# Patient Record
Sex: MALE | Race: WHITE | NOT HISPANIC OR LATINO | Employment: OTHER | URBAN - METROPOLITAN AREA
[De-identification: names, ages, dates, MRNs, and addresses within clinical notes are randomized per-mention and may not be internally consistent; named-entity substitution may affect disease eponyms.]

---

## 2017-01-01 ENCOUNTER — OFFICE VISIT (OUTPATIENT)
Dept: ONCOLOGY | Facility: CLINIC | Age: 82
End: 2017-01-01

## 2017-01-01 ENCOUNTER — HOSPITAL ENCOUNTER (OUTPATIENT)
Dept: CT IMAGING | Facility: HOSPITAL | Age: 82
Discharge: HOME | End: 2017-12-08
Admitting: INTERNAL MEDICINE

## 2017-01-01 ENCOUNTER — OFFICE VISIT (OUTPATIENT)
Dept: FAMILY MEDICINE CLINIC | Facility: CLINIC | Age: 82
End: 2017-01-01

## 2017-01-01 ENCOUNTER — LAB (OUTPATIENT)
Dept: LAB | Facility: HOSPITAL | Age: 82
End: 2017-01-01
Attending: INTERNAL MEDICINE

## 2017-01-01 ENCOUNTER — HOSPITAL ENCOUNTER (OUTPATIENT)
Dept: GENERAL RADIOLOGY | Facility: HOSPITAL | Age: 82
Discharge: HOME | End: 2017-11-29
Admitting: INTERNAL MEDICINE

## 2017-01-01 ENCOUNTER — TELEPHONE (OUTPATIENT)
Dept: FAMILY MEDICINE CLINIC | Facility: CLINIC | Age: 82
End: 2017-01-01

## 2017-01-01 VITALS
DIASTOLIC BLOOD PRESSURE: 58 MMHG | HEIGHT: 69 IN | HEART RATE: 88 BPM | RESPIRATION RATE: 18 BRPM | SYSTOLIC BLOOD PRESSURE: 120 MMHG | WEIGHT: 191 LBS | BODY MASS INDEX: 28.29 KG/M2

## 2017-01-01 VITALS
WEIGHT: 190.6 LBS | SYSTOLIC BLOOD PRESSURE: 128 MMHG | HEIGHT: 69 IN | HEART RATE: 79 BPM | BODY MASS INDEX: 28.23 KG/M2 | RESPIRATION RATE: 16 BRPM | OXYGEN SATURATION: 94 % | DIASTOLIC BLOOD PRESSURE: 84 MMHG | TEMPERATURE: 97.9 F

## 2017-01-01 VITALS
RESPIRATION RATE: 16 BRPM | WEIGHT: 192 LBS | BODY MASS INDEX: 28.44 KG/M2 | HEART RATE: 83 BPM | SYSTOLIC BLOOD PRESSURE: 138 MMHG | DIASTOLIC BLOOD PRESSURE: 64 MMHG | HEIGHT: 69 IN

## 2017-01-01 VITALS
RESPIRATION RATE: 16 BRPM | SYSTOLIC BLOOD PRESSURE: 124 MMHG | DIASTOLIC BLOOD PRESSURE: 72 MMHG | BODY MASS INDEX: 28.58 KG/M2 | HEART RATE: 60 BPM | HEIGHT: 69 IN | TEMPERATURE: 98 F | WEIGHT: 193 LBS | OXYGEN SATURATION: 98 %

## 2017-01-01 DIAGNOSIS — R07.81 RIB PAIN ON RIGHT SIDE: ICD-10-CM

## 2017-01-01 DIAGNOSIS — R93.89 ABNORMAL CHEST X-RAY: Primary | ICD-10-CM

## 2017-01-01 DIAGNOSIS — R91.8 MASS OF LEFT LUNG: Primary | ICD-10-CM

## 2017-01-01 DIAGNOSIS — E78.00 HYPERCHOLESTEROLEMIA: Primary | ICD-10-CM

## 2017-01-01 DIAGNOSIS — R60.0 BILATERAL EDEMA OF LOWER EXTREMITY: ICD-10-CM

## 2017-01-01 DIAGNOSIS — I10 ESSENTIAL HYPERTENSION: ICD-10-CM

## 2017-01-01 DIAGNOSIS — R91.8 OPACITY OF LUNG ON IMAGING STUDY: ICD-10-CM

## 2017-01-01 DIAGNOSIS — E11.42 TYPE 2 DIABETES MELLITUS WITH POLYNEUROPATHY (HCC): ICD-10-CM

## 2017-01-01 DIAGNOSIS — R07.81 RIB PAIN ON RIGHT SIDE: Primary | ICD-10-CM

## 2017-01-01 DIAGNOSIS — E78.00 HYPERCHOLESTEROLEMIA: ICD-10-CM

## 2017-01-01 DIAGNOSIS — D3A.012 BENIGN CARCINOID TUMOR OF ILEUM: Primary | ICD-10-CM

## 2017-01-01 DIAGNOSIS — R91.8 MASS OF LEFT LUNG: ICD-10-CM

## 2017-01-01 DIAGNOSIS — R10.11 RIGHT UPPER QUADRANT PAIN: ICD-10-CM

## 2017-01-01 DIAGNOSIS — E11.42 TYPE 2 DIABETES MELLITUS WITH DIABETIC POLYNEUROPATHY, WITHOUT LONG-TERM CURRENT USE OF INSULIN (HCC): ICD-10-CM

## 2017-01-01 DIAGNOSIS — I10 ESSENTIAL HYPERTENSION: Primary | ICD-10-CM

## 2017-01-01 DIAGNOSIS — Z00.00 MEDICARE ANNUAL WELLNESS VISIT, SUBSEQUENT: Primary | ICD-10-CM

## 2017-01-01 LAB
ALBUMIN SERPL-MCNC: 3.5 G/DL (ref 3.5–5.2)
ALBUMIN/GLOB SERPL: 1.5 G/DL
ALP SERPL-CCNC: 99 U/L (ref 39–117)
ALT SERPL-CCNC: 13 U/L (ref 1–41)
AST SERPL-CCNC: 11 U/L (ref 1–40)
BASOPHILS # BLD AUTO: 0.02 10*3/MM3 (ref 0–0.2)
BASOPHILS # BLD AUTO: 0.03 10*3/MM3 (ref 0–0.1)
BASOPHILS NFR BLD AUTO: 0.2 % (ref 0–1.5)
BASOPHILS NFR BLD AUTO: 0.5 % (ref 0–1.1)
BILIRUB SERPL-MCNC: 0.4 MG/DL (ref 0.1–1.2)
BUN SERPL-MCNC: 15 MG/DL (ref 8–23)
BUN/CREAT SERPL: 20.3 (ref 7–25)
CALCIUM SERPL-MCNC: 8.5 MG/DL (ref 8.6–10.5)
CHLORIDE SERPL-SCNC: 102 MMOL/L (ref 98–107)
CHOLEST SERPL-MCNC: 130 MG/DL (ref 0–200)
CO2 SERPL-SCNC: 29.8 MMOL/L (ref 22–29)
CREAT BLDA-MCNC: 0.8 MG/DL (ref 0.6–1.3)
CREAT SERPL-MCNC: 0.74 MG/DL (ref 0.76–1.27)
DEPRECATED RDW RBC AUTO: 45.9 FL (ref 37–49)
EOSINOPHIL # BLD AUTO: 0.07 10*3/MM3 (ref 0–0.36)
EOSINOPHIL # BLD AUTO: 0.07 10*3/MM3 (ref 0–0.7)
EOSINOPHIL NFR BLD AUTO: 0.9 % (ref 0.3–6.2)
EOSINOPHIL NFR BLD AUTO: 1.2 % (ref 1–5)
ERYTHROCYTE [DISTWIDTH] IN BLOOD BY AUTOMATED COUNT: 13.1 % (ref 11.7–14.5)
ERYTHROCYTE [DISTWIDTH] IN BLOOD BY AUTOMATED COUNT: 14.1 % (ref 11.5–14.5)
GLOBULIN SER CALC-MCNC: 2.3 GM/DL
GLUCOSE SERPL-MCNC: 130 MG/DL (ref 65–99)
HBA1C MFR BLD: 6.8 % (ref 4.8–5.6)
HCT VFR BLD AUTO: 44.1 % (ref 40–49)
HCT VFR BLD AUTO: 45.3 % (ref 40.4–52.2)
HDLC SERPL-MCNC: 52 MG/DL (ref 40–60)
HGB BLD-MCNC: 14.5 G/DL (ref 13.7–17.6)
HGB BLD-MCNC: 15.1 G/DL (ref 13.5–16.5)
IMM GRANULOCYTES # BLD: 0.02 10*3/MM3 (ref 0–0.03)
IMM GRANULOCYTES # BLD: 0.03 10*3/MM3 (ref 0–0.03)
IMM GRANULOCYTES NFR BLD: 0.2 % (ref 0–0.5)
IMM GRANULOCYTES NFR BLD: 0.5 % (ref 0–0.5)
LDLC SERPL CALC-MCNC: 54 MG/DL (ref 0–100)
LDLC/HDLC SERPL: 1.03 {RATIO}
LYMPHOCYTES # BLD AUTO: 1.26 10*3/MM3 (ref 1–3.5)
LYMPHOCYTES # BLD AUTO: 1.38 10*3/MM3 (ref 0.9–4.8)
LYMPHOCYTES NFR BLD AUTO: 17.2 % (ref 19.6–45.3)
LYMPHOCYTES NFR BLD AUTO: 20.9 % (ref 20–49)
MCH RBC QN AUTO: 32.4 PG (ref 27–32.7)
MCH RBC QN AUTO: 32.8 PG (ref 27–33)
MCHC RBC AUTO-ENTMCNC: 32 G/DL (ref 32.6–36.4)
MCHC RBC AUTO-ENTMCNC: 34.2 G/DL (ref 32–35)
MCV RBC AUTO: 101.3 FL (ref 79.8–96.2)
MCV RBC AUTO: 95.9 FL (ref 83–97)
MONOCYTES # BLD AUTO: 0.37 10*3/MM3 (ref 0.25–0.8)
MONOCYTES # BLD AUTO: 0.54 10*3/MM3 (ref 0.2–1.2)
MONOCYTES NFR BLD AUTO: 6.1 % (ref 4–12)
MONOCYTES NFR BLD AUTO: 6.7 % (ref 5–12)
NEUTROPHILS # BLD AUTO: 4.28 10*3/MM3 (ref 1.5–7)
NEUTROPHILS # BLD AUTO: 6.01 10*3/MM3 (ref 1.9–8.1)
NEUTROPHILS NFR BLD AUTO: 70.8 % (ref 39–75)
NEUTROPHILS NFR BLD AUTO: 74.8 % (ref 42.7–76)
NRBC BLD MANUAL-RTO: 0 /100 WBC (ref 0–0)
PLATELET # BLD AUTO: 200 10*3/MM3 (ref 150–375)
PLATELET # BLD AUTO: 221 10*3/MM3 (ref 140–500)
PMV BLD AUTO: 10.5 FL (ref 8.9–12.1)
POTASSIUM SERPL-SCNC: 4.6 MMOL/L (ref 3.5–5.2)
PROT SERPL-MCNC: 5.8 G/DL (ref 6–8.5)
RBC # BLD AUTO: 4.47 10*6/MM3 (ref 4.6–6)
RBC # BLD AUTO: 4.6 10*6/MM3 (ref 4.3–5.5)
SODIUM SERPL-SCNC: 143 MMOL/L (ref 136–145)
TRIGL SERPL-MCNC: 122 MG/DL (ref 0–150)
VLDLC SERPL CALC-MCNC: 24.4 MG/DL (ref 5–40)
WBC # BLD AUTO: 8.04 10*3/MM3 (ref 4.5–10.7)
WBC NRBC COR # BLD: 6.04 10*3/MM3 (ref 4–10)

## 2017-01-01 PROCEDURE — 99214 OFFICE O/P EST MOD 30 MIN: CPT | Performed by: INTERNAL MEDICINE

## 2017-01-01 PROCEDURE — 85025 COMPLETE CBC W/AUTO DIFF WBC: CPT | Performed by: INTERNAL MEDICINE

## 2017-01-01 PROCEDURE — 71101 X-RAY EXAM UNILAT RIBS/CHEST: CPT

## 2017-01-01 PROCEDURE — 99213 OFFICE O/P EST LOW 20 MIN: CPT | Performed by: INTERNAL MEDICINE

## 2017-01-01 PROCEDURE — 36415 COLL VENOUS BLD VENIPUNCTURE: CPT | Performed by: INTERNAL MEDICINE

## 2017-01-01 PROCEDURE — 82565 ASSAY OF CREATININE: CPT

## 2017-01-01 PROCEDURE — 0 IOPAMIDOL 61 % SOLUTION: Performed by: INTERNAL MEDICINE

## 2017-01-01 PROCEDURE — G0438 PPPS, INITIAL VISIT: HCPCS | Performed by: NURSE PRACTITIONER

## 2017-01-01 PROCEDURE — 71260 CT THORAX DX C+: CPT

## 2017-01-01 RX ORDER — HYDROCHLOROTHIAZIDE 12.5 MG/1
12.5 TABLET ORAL DAILY
Qty: 90 TABLET | Refills: 1 | Status: SHIPPED | OUTPATIENT
Start: 2017-01-01 | End: 2018-01-01 | Stop reason: SDUPTHER

## 2017-01-01 RX ORDER — LEVOFLOXACIN 500 MG/1
500 TABLET, FILM COATED ORAL DAILY
Qty: 7 TABLET | Refills: 0 | Status: SHIPPED | OUTPATIENT
Start: 2017-01-01 | End: 2017-01-01

## 2017-01-01 RX ORDER — INFLUENZA A VIRUS A/MICHIGAN/45/2015 X-275 (H1N1) ANTIGEN (FORMALDEHYDE INACTIVATED), INFLUENZA A VIRUS A/SINGAPORE/INFIMH-16-0019/2016 IVR-186 (H3N2) ANTIGEN (FORMALDEHYDE INACTIVATED), AND INFLUENZA B VIRUS B/MARYLAND/15/2016 BX-69A (A B/COLORADO/6/2017-LIKE VIRUS) ANTIGEN (FORMALDEHYDE INACTIVATED) 60; 60; 60 UG/.5ML; UG/.5ML; UG/.5ML
INJECTION, SUSPENSION INTRAMUSCULAR
Refills: 0 | COMMUNITY
Start: 2017-09-20 | End: 2017-01-01

## 2017-01-01 RX ADMIN — IOPAMIDOL 75 ML: 612 INJECTION, SOLUTION INTRAVENOUS at 09:04

## 2017-02-03 DIAGNOSIS — E78.00 HYPERCHOLESTEROLEMIA: ICD-10-CM

## 2017-02-03 DIAGNOSIS — I10 ESSENTIAL HYPERTENSION: Primary | ICD-10-CM

## 2017-02-03 DIAGNOSIS — E11.42 TYPE 2 DIABETES MELLITUS WITH POLYNEUROPATHY (HCC): ICD-10-CM

## 2017-02-03 RX ORDER — LOSARTAN POTASSIUM 100 MG/1
TABLET ORAL
Qty: 90 TABLET | Refills: 3 | Status: SHIPPED | OUTPATIENT
Start: 2017-02-03 | End: 2018-01-01 | Stop reason: SDUPTHER

## 2017-02-06 LAB
ALBUMIN SERPL-MCNC: 3.9 G/DL (ref 3.5–5.2)
ALBUMIN/GLOB SERPL: 1.9 G/DL
ALP SERPL-CCNC: 103 U/L (ref 39–117)
ALT SERPL-CCNC: 21 U/L (ref 1–41)
AST SERPL-CCNC: 20 U/L (ref 1–40)
BASOPHILS # BLD AUTO: 0.03 10*3/MM3 (ref 0–0.2)
BASOPHILS NFR BLD AUTO: 0.5 % (ref 0–1.5)
BILIRUB SERPL-MCNC: 0.4 MG/DL (ref 0.1–1.2)
BUN SERPL-MCNC: 13 MG/DL (ref 8–23)
BUN/CREAT SERPL: 14.8 (ref 7–25)
CALCIUM SERPL-MCNC: 9 MG/DL (ref 8.6–10.5)
CHLORIDE SERPL-SCNC: 102 MMOL/L (ref 98–107)
CHOLEST SERPL-MCNC: 147 MG/DL (ref 0–200)
CO2 SERPL-SCNC: 29.4 MMOL/L (ref 22–29)
CREAT SERPL-MCNC: 0.88 MG/DL (ref 0.76–1.27)
EOSINOPHIL # BLD AUTO: 0.11 10*3/MM3 (ref 0–0.7)
EOSINOPHIL NFR BLD AUTO: 1.8 % (ref 0.3–6.2)
ERYTHROCYTE [DISTWIDTH] IN BLOOD BY AUTOMATED COUNT: 14.4 % (ref 11.5–14.5)
GLOBULIN SER CALC-MCNC: 2.1 GM/DL
GLUCOSE SERPL-MCNC: 157 MG/DL (ref 65–99)
HBA1C MFR BLD: 6.6 % (ref 4.8–5.6)
HCT VFR BLD AUTO: 46.7 % (ref 40.4–52.2)
HDLC SERPL-MCNC: 61 MG/DL (ref 40–60)
HGB BLD-MCNC: 14.8 G/DL (ref 13.7–17.6)
IMM GRANULOCYTES # BLD: 0 10*3/MM3 (ref 0–0.03)
IMM GRANULOCYTES NFR BLD: 0 % (ref 0–0.5)
LDLC SERPL CALC-MCNC: 61 MG/DL (ref 0–100)
LDLC/HDLC SERPL: 1 {RATIO}
LYMPHOCYTES # BLD AUTO: 1.46 10*3/MM3 (ref 0.9–4.8)
LYMPHOCYTES NFR BLD AUTO: 23.9 % (ref 19.6–45.3)
MCH RBC QN AUTO: 31.6 PG (ref 27–32.7)
MCHC RBC AUTO-ENTMCNC: 31.7 G/DL (ref 32.6–36.4)
MCV RBC AUTO: 99.8 FL (ref 79.8–96.2)
MONOCYTES # BLD AUTO: 0.4 10*3/MM3 (ref 0.2–1.2)
MONOCYTES NFR BLD AUTO: 6.5 % (ref 5–12)
NEUTROPHILS # BLD AUTO: 4.12 10*3/MM3 (ref 1.9–8.1)
NEUTROPHILS NFR BLD AUTO: 67.3 % (ref 42.7–76)
PLATELET # BLD AUTO: 227 10*3/MM3 (ref 140–500)
POTASSIUM SERPL-SCNC: 4.6 MMOL/L (ref 3.5–5.2)
PROT SERPL-MCNC: 6 G/DL (ref 6–8.5)
RBC # BLD AUTO: 4.68 10*6/MM3 (ref 4.6–6)
SODIUM SERPL-SCNC: 142 MMOL/L (ref 136–145)
TRIGL SERPL-MCNC: 125 MG/DL (ref 0–150)
VLDLC SERPL CALC-MCNC: 25 MG/DL (ref 5–40)
WBC # BLD AUTO: 6.12 10*3/MM3 (ref 4.5–10.7)

## 2017-02-13 ENCOUNTER — OFFICE VISIT (OUTPATIENT)
Dept: FAMILY MEDICINE CLINIC | Facility: CLINIC | Age: 82
End: 2017-02-13

## 2017-02-13 VITALS
TEMPERATURE: 98.2 F | HEART RATE: 80 BPM | DIASTOLIC BLOOD PRESSURE: 70 MMHG | WEIGHT: 187 LBS | BODY MASS INDEX: 27.7 KG/M2 | HEIGHT: 69 IN | SYSTOLIC BLOOD PRESSURE: 125 MMHG | OXYGEN SATURATION: 95 % | RESPIRATION RATE: 16 BRPM

## 2017-02-13 DIAGNOSIS — E11.42 TYPE 2 DIABETES MELLITUS WITH DIABETIC POLYNEUROPATHY, WITHOUT LONG-TERM CURRENT USE OF INSULIN (HCC): ICD-10-CM

## 2017-02-13 DIAGNOSIS — Z23 NEED FOR VACCINATION: Primary | ICD-10-CM

## 2017-02-13 DIAGNOSIS — E78.00 HYPERCHOLESTEROLEMIA: ICD-10-CM

## 2017-02-13 DIAGNOSIS — I10 ESSENTIAL HYPERTENSION: ICD-10-CM

## 2017-02-13 PROCEDURE — 90471 IMMUNIZATION ADMIN: CPT | Performed by: INTERNAL MEDICINE

## 2017-02-13 PROCEDURE — 90715 TDAP VACCINE 7 YRS/> IM: CPT | Performed by: INTERNAL MEDICINE

## 2017-02-13 PROCEDURE — 99213 OFFICE O/P EST LOW 20 MIN: CPT | Performed by: INTERNAL MEDICINE

## 2017-02-13 RX ORDER — PRAVASTATIN SODIUM 40 MG
40 TABLET ORAL DAILY
Qty: 90 TABLET | Refills: 3 | Status: SHIPPED | OUTPATIENT
Start: 2017-02-13 | End: 2018-01-01 | Stop reason: SDUPTHER

## 2017-02-13 RX ORDER — PRAVASTATIN SODIUM 40 MG
40 TABLET ORAL DAILY
Qty: 90 TABLET | Refills: 3 | Status: SHIPPED | OUTPATIENT
Start: 2017-02-13 | End: 2017-01-01 | Stop reason: SDUPTHER

## 2017-02-13 RX ORDER — AZITHROMYCIN 250 MG/1
TABLET, FILM COATED ORAL
Qty: 6 TABLET | Refills: 0 | Status: SHIPPED | OUTPATIENT
Start: 2017-02-13 | End: 2017-01-01

## 2017-02-13 NOTE — PROGRESS NOTES
Subjective   Carlito Hicks is a 81 y.o. male. 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   • Balance problem      DUE TO VISION TROUBLE; USES A CANE   • COPD (chronic obstructive pulmonary disease)    • DDD (degenerative disc disease), cervical      SLIGHT NEUROPATHY RIGHT HAND   • Diabetes mellitus      DIET CONTROLLED   • Elevated prostate specific antigen (PSA)    • GERD (gastroesophageal reflux disease)    • Glaucoma    • H/O: lung cancer    • Hiatal hernia    • Hx of renal calculi    • Hx: recurrent pneumonia    • Hyperlipemia    • Hypertension    • Non-small cell lung cancer      Stage IB   • Partial small bowel obstruction    • Prostate cancer    • Sleep apnea      USES CPAP   • Spinal stenosis    • Visual impairment      BLIND LEFT EYE; 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    • Sulfa Antibiotics Other (See Comments)     Allergic to eye drops   • Tetracycline Other (See Comments)     BURNS ON THE BACK OF BOTH HANDS      Social History     Social History   • Marital status:      Spouse name: Lianna   • Number of children: 5   • Years of education: Post Grad     Occupational History   • [employer]  Retired     Social History Main Topics   • Smoking status: Former Smoker     Packs/day: 1.50     Years: 11.00     Types: Cigarettes     Quit date: 9/2/1975   • Smokeless tobacco: Never Used   • Alcohol use 1.2 oz/week     2 Shots of liquor per week   • Drug use: No   • Sexual activity: Defer     Other Topics Concern   • Not on file     Social  History Narrative        Current Outpatient Prescriptions:   •  albuterol (PROAIR HFA) 108 (90 BASE) MCG/ACT inhaler, Inhale 2 puffs every 4 (four) hours as needed for wheezing., Disp: , Rfl:   •  aspirin 81 MG tablet, Take 81 mg by mouth daily. PT TO STOP PER MD INSTRUCTION, Disp: , Rfl:   •  atropine 1 % ophthalmic solution, Administer 1 drop into the left eye daily., Disp: , Rfl:   •  cetirizine (ZyrTEC) 10 MG tablet, Take 10 mg by mouth daily., Disp: , Rfl:   •  cholecalciferol (VITAMIN D3) 1000 UNITS tablet, Take 1,000 Units by mouth daily., Disp: , Rfl:   •  Diphenhydramine-APAP, sleep, (TYLENOL PM EXTRA STRENGTH PO), Take 500 mg by mouth Every Night., Disp: , Rfl:   •  Docusate Calcium (STOOL SOFTENER PO), Take 100 mg by mouth Every Night., Disp: , Rfl:   •  losartan (COZAAR) 100 MG tablet, TAKE 1 TABLET DAILY, Disp: 90 tablet, Rfl: 3  •  pravastatin (PRAVACHOL) 40 MG tablet, Take 1 tablet by mouth Daily., Disp: 90 tablet, Rfl: 3  •  prednisoLONE acetate (PRED FORTE) 1 % ophthalmic suspension, Administer 1 drop into the left eye daily., Disp: , Rfl:   •  Probiotic Product (PROBIOTIC PO), Take  by mouth 2 (Two) Times a Day., Disp: , Rfl:   •  Psyllium (METAMUCIL PO), Take 4 capsules by mouth daily., Disp: , Rfl:   •  ranitidine (ZANTAC) 75 MG tablet, Take 75 mg by mouth every night., Disp: , Rfl:   •  azithromycin (ZITHROMAX) 250 MG tablet, Take 2 tablets the first day, then 1 tablet daily for 4 days., Disp: 6 tablet, Rfl: 0  •  pravastatin (PRAVACHOL) 40 MG tablet, Take 1 tablet by mouth Daily., Disp: 90 tablet, Rfl: 3     Objective     HPI Comments: He is here to follow-up with type 2 diabetes, hypertension and hypercholesterolemia.    He claims to feel well.    Diabetes     Hypertension     Hyperlipidemia          Review of Systems   Constitutional: Negative.    HENT: Negative.    Respiratory: Negative.    Cardiovascular: Negative.    Musculoskeletal: Negative.    Psychiatric/Behavioral: Negative.         Physical Exam   Constitutional: He is oriented to person, place, and time. He appears well-developed and well-nourished.   HENT:   Head: Normocephalic and atraumatic.   Pulmonary/Chest: Effort normal.   Neurological: He is alert and oriented to person, place, and time.   Psychiatric: He has a normal mood and affect. His behavior is normal.   Nursing note and vitals reviewed.        Problem List Items Addressed This Visit        Cardiovascular and Mediastinum    Essential hypertension    Hypercholesterolemia    Relevant Medications    pravastatin (PRAVACHOL) 40 MG tablet       Endocrine    Type 2 diabetes mellitus      Other Visit Diagnoses     Need for vaccination    -  Primary    Relevant Orders    Td Vaccine Greater Than or Equal To 8yo With Preservative IM (Completed)            PLAN his excellent control of his type 2 diabetes.    His hypertension is well-controlled.    His hypercholesterolemia is well-controlled.    I like to see him back in about 3-4 months.  About a week before that visit I would like you following labs: Comprehensive metabolic panel, urine microalbumin, hemoglobin A1c.    He ought to get a lipid profile and conference metabolic panel done approximately every 6 months.    He ought follow-up for a yearly Medicare wellness visit.    No Follow-up on file.

## 2017-05-09 DIAGNOSIS — I10 ESSENTIAL HYPERTENSION: Primary | ICD-10-CM

## 2017-05-09 DIAGNOSIS — E11.42 TYPE 2 DIABETES MELLITUS WITH POLYNEUROPATHY (HCC): ICD-10-CM

## 2017-05-09 DIAGNOSIS — E78.00 HYPERCHOLESTEROLEMIA: ICD-10-CM

## 2017-05-11 LAB
ALBUMIN SERPL-MCNC: 3.4 G/DL (ref 3.5–5.2)
ALBUMIN/CREAT UR: <3.1 MG/G CREAT (ref 0–30)
ALBUMIN/GLOB SERPL: 1.2 G/DL
ALP SERPL-CCNC: 110 U/L (ref 39–117)
ALT SERPL-CCNC: 25 U/L (ref 1–41)
AST SERPL-CCNC: 18 U/L (ref 1–40)
BILIRUB SERPL-MCNC: 0.4 MG/DL (ref 0.1–1.2)
BUN SERPL-MCNC: 16 MG/DL (ref 8–23)
BUN/CREAT SERPL: 17.2 (ref 7–25)
CALCIUM SERPL-MCNC: 9 MG/DL (ref 8.6–10.5)
CHLORIDE SERPL-SCNC: 101 MMOL/L (ref 98–107)
CHOLEST SERPL-MCNC: 135 MG/DL (ref 0–200)
CO2 SERPL-SCNC: 29.1 MMOL/L (ref 22–29)
CREAT SERPL-MCNC: 0.93 MG/DL (ref 0.76–1.27)
CREAT UR-MCNC: 96.2 MG/DL
GLOBULIN SER CALC-MCNC: 2.8 GM/DL
GLUCOSE SERPL-MCNC: 127 MG/DL (ref 65–99)
HBA1C MFR BLD: 6.3 % (ref 4.8–5.6)
HDLC SERPL-MCNC: 59 MG/DL (ref 40–60)
LDLC SERPL CALC-MCNC: 60 MG/DL (ref 0–100)
LDLC/HDLC SERPL: 1.01 {RATIO}
MICROALBUMIN UR-MCNC: <3 UG/ML
POTASSIUM SERPL-SCNC: 4.7 MMOL/L (ref 3.5–5.2)
PROT SERPL-MCNC: 6.2 G/DL (ref 6–8.5)
SODIUM SERPL-SCNC: 141 MMOL/L (ref 136–145)
TRIGL SERPL-MCNC: 82 MG/DL (ref 0–150)
VLDLC SERPL CALC-MCNC: 16.4 MG/DL (ref 5–40)

## 2017-05-18 ENCOUNTER — OFFICE VISIT (OUTPATIENT)
Dept: FAMILY MEDICINE CLINIC | Facility: CLINIC | Age: 82
End: 2017-05-18

## 2017-05-18 VITALS
TEMPERATURE: 97.9 F | HEIGHT: 69 IN | SYSTOLIC BLOOD PRESSURE: 118 MMHG | DIASTOLIC BLOOD PRESSURE: 68 MMHG | OXYGEN SATURATION: 98 % | BODY MASS INDEX: 27.55 KG/M2 | RESPIRATION RATE: 16 BRPM | WEIGHT: 186 LBS | HEART RATE: 83 BPM

## 2017-05-18 DIAGNOSIS — E78.00 HYPERCHOLESTEROLEMIA: ICD-10-CM

## 2017-05-18 DIAGNOSIS — I10 ESSENTIAL HYPERTENSION: Primary | ICD-10-CM

## 2017-05-18 DIAGNOSIS — E11.42 TYPE 2 DIABETES MELLITUS WITH DIABETIC POLYNEUROPATHY, WITHOUT LONG-TERM CURRENT USE OF INSULIN (HCC): ICD-10-CM

## 2017-05-18 PROCEDURE — 99213 OFFICE O/P EST LOW 20 MIN: CPT | Performed by: INTERNAL MEDICINE

## 2017-09-05 DIAGNOSIS — E11.42 TYPE 2 DIABETES MELLITUS WITH POLYNEUROPATHY (HCC): ICD-10-CM

## 2017-09-05 DIAGNOSIS — I10 ESSENTIAL HYPERTENSION: Primary | ICD-10-CM

## 2017-09-05 DIAGNOSIS — E78.00 HYPERCHOLESTEROLEMIA: ICD-10-CM

## 2017-09-11 ENCOUNTER — TELEPHONE (OUTPATIENT)
Dept: ONCOLOGY | Facility: CLINIC | Age: 82
End: 2017-09-11

## 2017-09-11 NOTE — TELEPHONE ENCOUNTER
Pt calling because he has some questions about his 1 year f/u. Attempted to call back, no answer. Left message for him to return call.

## 2017-09-12 ENCOUNTER — TELEPHONE (OUTPATIENT)
Dept: GENERAL RADIOLOGY | Facility: HOSPITAL | Age: 82
End: 2017-09-12

## 2017-09-12 ENCOUNTER — TELEPHONE (OUTPATIENT)
Dept: ONCOLOGY | Facility: HOSPITAL | Age: 82
End: 2017-09-12

## 2017-09-12 NOTE — TELEPHONE ENCOUNTER
----- Message from Ania Theodore RN sent at 9/12/2017 12:42 PM EDT -----  Patient was sent a yearly letter to set up his labs and scans. He does NOT want to do the scan and only wants to do the lab draw (also refusing the urine test as well, so just blood work). Will CC  to let him know.

## 2017-09-12 NOTE — TELEPHONE ENCOUNTER
Patient called to verify what all he was needing to be scheduled for with his upcoming yearly appnts. Explained the octreotide scan, 24hr urine collection and blood work. Pt is refusing to have the scan or 24hr urine. States  gave him the all clear last year and he doesn't want to do it bc of the cost of the test. Msg sent to scheduling and to .

## 2017-09-13 LAB
ALBUMIN SERPL-MCNC: 3.8 G/DL (ref 3.5–5.2)
ALBUMIN/GLOB SERPL: 1.9 G/DL
ALP SERPL-CCNC: 111 U/L (ref 39–117)
ALT SERPL-CCNC: 18 U/L (ref 1–41)
AST SERPL-CCNC: 14 U/L (ref 1–40)
BILIRUB SERPL-MCNC: 0.4 MG/DL (ref 0.1–1.2)
BUN SERPL-MCNC: 13 MG/DL (ref 8–23)
BUN/CREAT SERPL: 16.5 (ref 7–25)
CALCIUM SERPL-MCNC: 9 MG/DL (ref 8.6–10.5)
CHLORIDE SERPL-SCNC: 100 MMOL/L (ref 98–107)
CHOLEST SERPL-MCNC: 139 MG/DL (ref 0–200)
CO2 SERPL-SCNC: 30.5 MMOL/L (ref 22–29)
CREAT SERPL-MCNC: 0.79 MG/DL (ref 0.76–1.27)
GLOBULIN SER CALC-MCNC: 2 GM/DL
GLUCOSE SERPL-MCNC: 146 MG/DL (ref 65–99)
HBA1C MFR BLD: 6.52 % (ref 4.8–5.6)
HDLC SERPL-MCNC: 63 MG/DL (ref 40–60)
LDLC SERPL CALC-MCNC: 57 MG/DL (ref 0–100)
LDLC/HDLC SERPL: 0.91 {RATIO}
POTASSIUM SERPL-SCNC: 4.9 MMOL/L (ref 3.5–5.2)
PROT SERPL-MCNC: 5.8 G/DL (ref 6–8.5)
SODIUM SERPL-SCNC: 141 MMOL/L (ref 136–145)
TRIGL SERPL-MCNC: 94 MG/DL (ref 0–150)
VLDLC SERPL CALC-MCNC: 18.8 MG/DL (ref 5–40)

## 2017-09-20 ENCOUNTER — OFFICE VISIT (OUTPATIENT)
Dept: FAMILY MEDICINE CLINIC | Facility: CLINIC | Age: 82
End: 2017-09-20

## 2017-09-20 VITALS
SYSTOLIC BLOOD PRESSURE: 124 MMHG | OXYGEN SATURATION: 98 % | HEIGHT: 69 IN | WEIGHT: 189 LBS | TEMPERATURE: 98 F | BODY MASS INDEX: 27.99 KG/M2 | RESPIRATION RATE: 16 BRPM | DIASTOLIC BLOOD PRESSURE: 72 MMHG | HEART RATE: 80 BPM

## 2017-09-20 DIAGNOSIS — E78.00 HYPERCHOLESTEROLEMIA: ICD-10-CM

## 2017-09-20 DIAGNOSIS — E11.42 TYPE 2 DIABETES MELLITUS WITH DIABETIC POLYNEUROPATHY, WITHOUT LONG-TERM CURRENT USE OF INSULIN (HCC): ICD-10-CM

## 2017-09-20 DIAGNOSIS — I10 ESSENTIAL HYPERTENSION: Primary | ICD-10-CM

## 2017-09-20 PROCEDURE — 99213 OFFICE O/P EST LOW 20 MIN: CPT | Performed by: INTERNAL MEDICINE

## 2017-09-20 NOTE — PROGRESS NOTES
Subjective   Carlito Hicks is a 82 y.o. male. 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   • Balance problem     DUE TO VISION TROUBLE; USES A CANE   • COPD (chronic obstructive pulmonary disease)    • DDD (degenerative disc disease), cervical     SLIGHT NEUROPATHY RIGHT HAND   • Diabetes mellitus     DIET CONTROLLED   • Elevated prostate specific antigen (PSA)    • GERD (gastroesophageal reflux disease)    • Glaucoma    • H/O: lung cancer    • Hiatal hernia    • Hx of renal calculi    • Hx: recurrent pneumonia    • Hyperlipemia    • Hypertension    • Non-small cell lung cancer     Stage IB   • Partial small bowel obstruction    • Prostate cancer    • Sleep apnea     USES CPAP   • Spinal stenosis    • Visual impairment     BLIND LEFT EYE; 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    • Sulfa Antibiotics Other (See Comments)     Allergic to eye drops   • Tetracycline Other (See Comments)     BURNS ON THE BACK OF BOTH HANDS      Social History     Social History   • Marital status:      Spouse name: Lianna   • Number of children: 5   • Years of education: Post Grad     Occupational History   • [employer]  Retired     Social History Main Topics   • Smoking status: Former Smoker     Packs/day: 1.50     Years: 11.00     Types: Cigarettes     Quit date: 9/2/1975   • Smokeless tobacco: Never Used   • Alcohol use 1.2 oz/week     2 Shots of liquor per week   • Drug use: No   • Sexual activity: Defer     Other Topics Concern   • Not on file     Social History  Narrative        Current Outpatient Prescriptions:   •  albuterol (PROAIR HFA) 108 (90 BASE) MCG/ACT inhaler, Inhale 2 puffs every 4 (four) hours as needed for wheezing., Disp: , Rfl:   •  aspirin 81 MG tablet, Take 81 mg by mouth daily. PT TO STOP PER MD INSTRUCTION, Disp: , Rfl:   •  atropine 1 % ophthalmic solution, Administer 1 drop into the left eye daily., Disp: , Rfl:   •  azithromycin (ZITHROMAX) 250 MG tablet, Take 2 tablets the first day, then 1 tablet daily for 4 days., Disp: 6 tablet, Rfl: 0  •  cetirizine (ZyrTEC) 10 MG tablet, Take 10 mg by mouth daily., Disp: , Rfl:   •  cholecalciferol (VITAMIN D3) 1000 UNITS tablet, Take 1,000 Units by mouth daily., Disp: , Rfl:   •  Diphenhydramine-APAP, sleep, (TYLENOL PM EXTRA STRENGTH PO), Take 500 mg by mouth Every Night., Disp: , Rfl:   •  Docusate Calcium (STOOL SOFTENER PO), Take 100 mg by mouth Every Night., Disp: , Rfl:   •  losartan (COZAAR) 100 MG tablet, TAKE 1 TABLET DAILY, Disp: 90 tablet, Rfl: 3  •  pravastatin (PRAVACHOL) 40 MG tablet, Take 1 tablet by mouth Daily., Disp: 90 tablet, Rfl: 3  •  pravastatin (PRAVACHOL) 40 MG tablet, Take 1 tablet by mouth Daily., Disp: 90 tablet, Rfl: 3  •  prednisoLONE acetate (PRED FORTE) 1 % ophthalmic suspension, Administer 1 drop into the left eye daily., Disp: , Rfl:   •  Probiotic Product (PROBIOTIC PO), Take  by mouth 2 (Two) Times a Day., Disp: , Rfl:   •  Psyllium (METAMUCIL PO), Take 4 capsules by mouth daily., Disp: , Rfl:   •  ranitidine (ZANTAC) 75 MG tablet, Take 75 mg by mouth every night., Disp: , Rfl:      Objective     HPI Comments: He is here today to follow-up on some lab work done last week for routine monitoring of his type 2 diabetes, hypercholesterolemia, and hypertension.    He tells me that he feels well.    Hypertension     Hyperlipidemia     Diabetes          Review of Systems   Constitutional: Negative.    HENT: Negative.    Respiratory: Negative.    Cardiovascular: Negative.     Musculoskeletal: Negative.    Psychiatric/Behavioral: Negative.        Physical Exam   Constitutional: He is oriented to person, place, and time. He appears well-developed and well-nourished.   HENT:   Head: Normocephalic and atraumatic.   Pulmonary/Chest: Effort normal.   Neurological: He is alert and oriented to person, place, and time.   Psychiatric: He has a normal mood and affect. His behavior is normal.   Nursing note and vitals reviewed.        Problem List Items Addressed This Visit        Cardiovascular and Mediastinum    Essential hypertension - Primary    Hypercholesterolemia       Endocrine    Type 2 diabetes mellitus            PLAN  His type 2 diabetes is well-controlled.    His hypercholesterolemia is well-controlled.    His hypertension is well-controlled.    I asked him to follow-up in 3 months.  No Follow-up on file.

## 2017-10-18 NOTE — PROGRESS NOTES
REASON FOR FOLLOWUP:   1. 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   2.  Negative octreotide scan on 10/05/2016.  Negative 24-hour urine 5 HIAA and the negative serum chromogranin A level.         HISTORY OF PRESENT ILLNESS: The patient is an 82 y.o. year old male here for annual reevaluation.  Patient is accompanied by his wife.  Patient declined octreotide scan this year.   He reports doing very well, except is getting older.  His performance status ECOG 1.  Denies pains no bloating in the abdomen.  He has been eating well, no weight loss, no nausea vomiting no diarrhea constipation.      Laboratory study today showed a normal CBC including hemoglobin 15.1, WBC 6000, and platelets 200,000.  He had a normal CMP except elevated glucose at 146 last month on 9/13/2017.           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   • Balance problem         DUE TO VISION TROUBLE; USES A CANE   • COPD (chronic obstructive pulmonary disease)     • DDD (degenerative disc disease), cervical         SLIGHT NEUROPATHY RIGHT HAND   • Diabetes mellitus         DIET CONTROLLED   • Elevated prostate specific antigen (PSA)     • GERD (gastroesophageal reflux disease)     • Glaucoma and cataract      • H/O: lung cancer     • Hiatal hernia     • Hx of renal calculi     • Hx: recurrent pneumonia     • Hyperlipemia     • Hypertension     • Partial small bowel obstruction     • Prostate cancer     • Sleep apnea         USES CPAP   • Spinal stenosis     • Visual impairment         BLIND LEFT EYE; 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    • Hernia repair       • Lung surgery       • Other surgical history            NASAL SEPTAL DEVIATION REPAIR   • Prostate surgery       • Wrist surgery       • Colonoscopy   10/25/2011   • Esophagoscopy / egd   11/13/2013   • Tonsillectomy       • Nasal septum surgery       • Cystoscopy bladder stone lithotripsy       • Thoracotomy       • Colon resection Right 9/8/2016       Procedure: SMALL BOWEL RESECTION LAPAROSCOPIC ; Surgeon: Josef Veloz MD; Location: [facility]; Service:          HEMATOLOGIC/ONCOLOGIC HISTORY: (History from previous dates can be found in the separate document.)  Also see above current history.      1.  Prostate cancer 2004 status post a radical prostatectomy, PSA was negative in January 2016, patient follows by Dr. Saucedo.      2. Non-small cell lung cancer. Initially had a right lobe lobectomy in 2006 for stage IB disease. Subsequently had second primary lung cancer status post right pneumonectomy in August 2008. No adjuvant chemotherapy. Had positive margin but unable to receive radiation therapy due to a persistent fistula at that time. Patient follows up with Dr. Woods regularly.  No evidence of disease recurrence with most recent CT scan 08/18/2016.     3. Surgical resection of small bowel carcinoid by Dr. Roselia MD at the [facility] in September 2016.      According to patient, patient has been followed by his urologist Dr. Saucedo regularly because of abnormal lesion in his kidney. He roughly gets CT scan about every 6 months. Most recent his CT scan on 08/18/2016.  I reviewed her CT scan report, which showed 2 small low attenuation lesions in the left kidney and 2 lesions in the right kidney, or 10 mm or less. However there is size of small bowel obstruction with dilatation of the distal ileum up to 5 cm in diameter, proximal to a segment of the bowel measuring 20 mm in diameter in 26 mm in length. There is also 15 mm partially calcified mesenteric node. There is no evidence of lymphadenopathy. There is no evidence of disease in the  chest, with stable right pneumonectomy changes.      Patient subsequently was seen by Dr. Veloz for evaluation. Dr. Veloz did a exploratory laparoscopic surgery with resection of partial small bowel was and mesenteric mass on 09/08/2016.     Pathology evaluation of the small bowel lesion reported well-differentiated neuroendocrine tumor, carcinoid tumor, extending through full thickness of the muscularis propria and into the adipose tissue. The tumor measures 2.3 cm ×2.0×2.0, unifocal, pT3 disease. Margins were free of tumor. There was 3 benign mesenteric lymph nodes. There is a mesenteric mass, measuring 1.9 cm in the largest examination, showed well differentiated neuroendocrine tumor, carcinoid tumor diffusely infiltrating soft tissue. The second mesenteric mass showed infarcted the adipose tissue.      He reports prior to the surgery, he only had one time of diarrhea, and may be a few times with mild hot flashes eyes phase but it nothing was extra ordinarily.  He was purposefully losing weight, had a good appetite, no abdomen pain no nausea vomiting.      Laboratory study on on 09/11/2016 reported a serum chromogranin level at 3 nmol/L, 24-hour urine sample 5 HIAA and 2.6 mg/L, with total 24-hour urine 5 HIAA at 4.7 MG.      Patient had a negative octreotide scan on 10/05/2016.      MEDICATIONS: Reviewed and documented on EMR.  Including losartan, pravastatin, albuterol inhaler as needed, low-dose aspirin, Metamucil, stool softener, Tylenol PM, vitamin D3, ranitidine, Zyrtec, atropine sulfate ophtha solution, prednisone acetate ophtha solution     ALLERGIES:          Allergies   Allergen Reactions   • Brimonidine         EYE INFLAMATION.    • Sulfa Antibiotics Other (See Comments)       Allergic to eye drops   • Tetracycline Other (See Comments)       BURNS ON THE BACK OF BOTH HANDS         SOCIAL HISTORY:    Social History    Social History            Social History   • Marital status:        Spouse  name: N/A   • Number of children: 5   • Years of education:  graduated school          Occupational History   •  retired  [employer]              Social History Main Topics   • Smoking status: Former Smoker, 22-pack-year        Quit date: 9/2/1975   • Smokeless tobacco: Never Used   • Alcohol use 1.2 oz/week        2 Shots of liquor per week    • Drug use: No   • Sexual activity: Defer                   FAMILY HISTORY:        Family History   Problem Relation Age of Onset   • Cancer  mother diagnosis of breast cancer age 72, however she passed away at age of 84 due to heart attack.       • Heart disease  mother       Father passed away at age of 90 due to stroke.  He was hypertensive and also had gallbladder stone.  Patient had one sister diagnosed of breast cancer age of 73, and diagnosed of ovarian cancer at ages 75, and colon cancer at age 79 and then passed away at age of 81 due to malignancy.       REVIEW OF SYSTEMS:  GENERAL: No change in appetite or weight;   No fevers, chills, sweats.   SKIN: No nonhealing lesions.  No rashes.  HEME/LYMPH: No easy bruising, bleeding.   No swollen nodes.   EYES: Patient is blind with left eye.  Right eye has poor vision.  ENT: No tinnitus, hearing loss, gum bleeding, epistaxis, hoarseness or dysphagia.  Patient has poor hearing  RESPIRATORY: Mild cough, and shortness of breath because of pneumonectomy, no hemoptysis or wheezing.   CVS: No chest pain, palpitations, orthopnea, dyspnea on exertion or PND.   GI: See history of present illness.   : No lower tract obstructive symptoms, dysuria or hematuria.  Has increase in nocturia.   MUSCULOSKELETAL: Has chronic back pain.    NEUROLOGICAL: No global weakness, loss of consciousness or seizures.   PSYCHIATRIC: No increased nervousness, mood changes or depression.      Objective       Vitals:    10/18/17 1130   BP: 128/84   Pulse: 79   Resp: 16   Temp: 97.9 °F (36.6 °C)   TempSrc: Oral   SpO2: 94%   Weight: 190 lb 9.6 oz  "(86.5 kg)   Height: 68.5\" (174 cm)  Comment: New Ht Yearly   PainSc: 0-No pain   ECOG 1         PHYSICAL EXAM:   GENERAL: Well-developed, well-nourished elder male in no acute distress.    SKIN: Warm, dry without rashes, purpura or petechiae.  HEAD: Normocephalic.  EYES: Left eye blindness due to infection.  Right eye has poor vision. Conjunctivae normal.  EARS: Poor hearing  NOSE: No nasal discharge.  MOUTH: Tongue is well-papillated; no stomatitis or ulcers. Lips normal.  THROAT: Oropharynx without lesions or exudates.  NECK: Supple with good range of motion; no thyromegaly or masses.  LYMPHATICS: No cervical, supraclavicular, axillary adenopathy.  CHEST: Lungs clear to auscultation on the left sided. The right lung has diminished breathing sounds.   CARDIAC: Regular rate and rhythm without murmurs, rubs or gallops. Normal S1,S2.  ABDOMEN: Soft, nontender with no organomegaly or masses.  Bowel sounds normal.  EXTREMITIES: No clubbing, cyanosis or edema.  NEUROLOGICAL: Cranial Nerves II-XII grossly intact, except his vision.   PSYCHIATRIC: Normal affect and mood.      RECENT LABS:     Lab Results   Component Value Date    WBC 6.04 10/18/2017    HGB 15.1 10/18/2017    HCT 44.1 10/18/2017    MCV 95.9 10/18/2017     10/18/2017     Lab Results   Component Value Date    NEUTROABS 4.28 10/18/2017     Lab Results   Component Value Date    GLUCOSE 173 (H) 09/10/2016    BUN 13 09/13/2017    CREATININE 0.79 09/13/2017    EGFRIFNONA 94 09/13/2017    EGFRIFAFRI 114 09/13/2017    BCR 16.5 09/13/2017    CO2 30.5 (H) 09/13/2017    CALCIUM 9.0 09/13/2017    PROTENTOTREF 5.8 (L) 09/13/2017    ALBUMIN 3.80 09/13/2017    LABIL2 1.9 09/13/2017    AST 14 09/13/2017    ALT 18 09/13/2017     Sodium   Date Value Ref Range Status   09/13/2017 141 136 - 145 mmol/L Final   09/10/2016 140 136 - 145 mmol/L Final     Potassium   Date Value Ref Range Status   09/13/2017 4.9 3.5 - 5.2 mmol/L Final   09/10/2016 3.7 3.5 - 5.2 mmol/L Final "     Total Bilirubin   Date Value Ref Range Status   09/13/2017 0.4 0.1 - 1.2 mg/dL Final     Alkaline Phosphatase   Date Value Ref Range Status   09/13/2017 111 39 - 117 U/L Final   ]         Assessment/Plan      1. Carcinoid involving the distal small bowel and the mesentery, status post surgical resection on 09/08/2016.  Laboratory study showed normal serum chromogranin A level, together with 24-hour urine 5-HIAA level on 09/12/2016.  He had octreotide scan on 10/05/2016, which has been reported completing negative.     This year patient declined octreotide scan as well as 24-hour urine study and a blood study.  He reports doing well, no symptoms related to carcinoid.  Patient thinks he is very healthy except is getting older with some chronic arthritis pain.     2.  History of lung cancer, twice eventually had a right pneumonectomy in 2008.  Physical examination today is negative for lymphadenopathy.  He has no cough or hemoptysis or weight loss, etc.   No evidence of disease recurrence.      3.  We'll bring patient back for annual MD visit, we'll check CBC at that time.      ISABEL TAMEZ M.D., Ph.D.    10/18/2017        Cc:  MD Dr. Alexis Alfaro Dr., M.D.    Dr. Carlito Saucedo M.D.          Dragon disclaimer:  Much of this encounter note is an electronic transcription/translation of spoken language to printed text. The electronic translation of spoken language may permit erroneous, or at times, nonsensical words or phrases to be inadvertently transcribed; Although I have reviewed the note for such errors, some may still exist.

## 2017-11-29 PROBLEM — R07.81 RIB PAIN ON RIGHT SIDE: Status: ACTIVE | Noted: 2017-01-01

## 2017-11-29 NOTE — PATIENT INSTRUCTIONS
I believe that your pain is musculoskeletal.  We will check a PA chest x-ray and right rib details.

## 2017-11-29 NOTE — PROGRESS NOTES
Subjective   Carlito Hicks is a 82 y.o. male. Patient is here today for   Chief Complaint   Patient presents with   • Right Sided Rib Pain     x few months- pulled a heavy table           Vitals:    11/29/17 0916   BP: 138/64   Pulse: 83   Resp: 16     The following portions of the patient's history were reviewed and updated as appropriate: allergies, current medications, past family history, past medical history, past social history, past surgical history and problem list.    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   • Balance problem     DUE TO VISION TROUBLE; USES A CANE   • COPD (chronic obstructive pulmonary disease)    • DDD (degenerative disc disease), cervical     SLIGHT NEUROPATHY RIGHT HAND   • Diabetes mellitus     DIET CONTROLLED   • Elevated prostate specific antigen (PSA)    • GERD (gastroesophageal reflux disease)    • Glaucoma    • H/O: lung cancer    • Hiatal hernia    • Hx of renal calculi    • Hx: recurrent pneumonia    • Hyperlipemia    • Hypertension    • Non-small cell lung cancer     Stage IB   • Partial small bowel obstruction    • Prostate cancer    • Sleep apnea     USES CPAP   • Spinal stenosis    • Visual impairment     BLIND LEFT EYE; 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    • Sulfa Antibiotics Other (See Comments)     Allergic to eye drops   • Tetracycline Other (See Comments)     BURNS ON THE BACK OF BOTH HANDS      Social History     Social History   • Marital status:      Spouse name: Lianna   • Number of children: 5   • Years of education: Post Grad     Occupational History   • [employer]  Retired     Social History Main Topics   • Smoking status: Former Smoker     Packs/day: 1.50     Years: 22.00     Types: Cigarettes     Quit date: 9/2/1975   •  Smokeless tobacco: Never Used   • Alcohol use 1.2 oz/week     2 Shots of liquor per week   • Drug use: No   • Sexual activity: Defer     Other Topics Concern   • Not on file     Social History Narrative        Current Outpatient Prescriptions:   •  albuterol (PROAIR HFA) 108 (90 BASE) MCG/ACT inhaler, Inhale 2 puffs every 4 (four) hours as needed for wheezing., Disp: , Rfl:   •  aspirin 81 MG tablet, Take 81 mg by mouth daily. PT TO STOP PER MD INSTRUCTION, Disp: , Rfl:   •  atropine 1 % ophthalmic solution, Administer 1 drop into the left eye daily., Disp: , Rfl:   •  cetirizine (ZyrTEC) 10 MG tablet, Take 10 mg by mouth daily., Disp: , Rfl:   •  cholecalciferol (VITAMIN D3) 1000 UNITS tablet, Take 1,000 Units by mouth daily., Disp: , Rfl:   •  Diphenhydramine-APAP, sleep, (TYLENOL PM EXTRA STRENGTH PO), Take 500 mg by mouth Every Night., Disp: , Rfl:   •  Docusate Calcium (STOOL SOFTENER PO), Take 100 mg by mouth Every Night., Disp: , Rfl:   •  losartan (COZAAR) 100 MG tablet, TAKE 1 TABLET DAILY, Disp: 90 tablet, Rfl: 3  •  pravastatin (PRAVACHOL) 40 MG tablet, Take 1 tablet by mouth Daily., Disp: 90 tablet, Rfl: 3  •  prednisoLONE acetate (PRED FORTE) 1 % ophthalmic suspension, Administer 1 drop into the left eye daily., Disp: , Rfl:   •  Probiotic Product (PROBIOTIC PO), Take  by mouth 2 (Two) Times a Day., Disp: , Rfl:   •  Psyllium (METAMUCIL PO), Take 4 capsules by mouth daily., Disp: , Rfl:   •  ranitidine (ZANTAC) 75 MG tablet, Take 75 mg by mouth every night., Disp: , Rfl:      Objective     History of Present Illness Carlito bent over about 6 weeks ago and felt a painful pop in his right anterior lower rib cage.  He continues to complain of pain in certain positions like bending over.  He does not have pain when he lies on the right side.  He denies any pulmonary or GI complaints.  He does have a distant history of lung cancer and has had a right pneumonectomy.  He also has history of small bowel  carcinoid tumor.      Review of Systems   Constitutional: Negative.    Respiratory: Negative for shortness of breath.    Cardiovascular: Negative for chest pain.   Gastrointestinal: Negative for abdominal pain.       Physical Exam   Constitutional: He appears well-developed and well-nourished.   Early gentleman who is alert pleasant and cooperative   Cardiovascular: Normal rate, regular rhythm and normal heart sounds.    Pulmonary/Chest: Effort normal. No respiratory distress. He has no wheezes. He has no rales. He exhibits no tenderness.   Abdominal: Soft. Bowel sounds are normal. He exhibits no distension and no mass. There is no tenderness.   Musculoskeletal:   There is no palpable tenderness of the right rib cage or costochondral joints   Neurological: He is alert.   Psychiatric: He has a normal mood and affect.   Vitals reviewed.      ASSESSMENT     Problem List Items Addressed This Visit        Nervous and Auditory    Rib pain on right side - Primary    Relevant Orders    XR Ribs Right With PA Chest (Completed)          PLAN  Patient Instructions   I believe that your pain is musculoskeletal.  We will check a PA chest x-ray and right rib details.    Return if symptoms worsen or fail to improve.

## 2017-12-04 NOTE — TELEPHONE ENCOUNTER
CALLED PATIENT BACK AND NOTIFIED HIM THAT XR JUST SHOWED OLD RIB FRACTURES. ALSO LEFT PERIHILAR NODULAR OPACITY AND NEEDS CT W + W/O CONTRAST DONE.   PATIENT UNDERSTOOD.    ----- Message from Charlotte Motta MA sent at 12/4/2017  2:37 PM EST -----  This patient saw Dr. Leong for this   ----- Message -----     From: Antonina Spear     Sent: 12/4/2017   2:25 PM       To: Charlotte Motta MA    WANTING RESULTS OF XRAYS FROM LAST WEEK     [phone]

## 2017-12-12 PROBLEM — R91.8 OPACITY OF LUNG ON IMAGING STUDY: Status: ACTIVE | Noted: 2017-01-01

## 2017-12-12 NOTE — PATIENT INSTRUCTIONS
Reviewed and discussed results of x-rays and recent CT scan.  We'll additionally check a complete blood count today.  Start Levaquin 100 mg daily for 7 days.  We'll plan on repeating a chest x-ray in one month.

## 2017-12-12 NOTE — PROGRESS NOTES
Subjective   Carlito Hicks is a 82 y.o. male. Patient is here today for   Chief Complaint   Patient presents with   • Follow-up     CT RESULTS          Vitals:    12/12/17 1310   BP: 120/58   Pulse: 88   Resp: 18     The following portions of the patient's history were reviewed and updated as appropriate: allergies, current medications, past family history, past medical history, past social history, past surgical history and problem list.    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   • Balance problem     DUE TO VISION TROUBLE; USES A CANE   • COPD (chronic obstructive pulmonary disease)    • DDD (degenerative disc disease), cervical     SLIGHT NEUROPATHY RIGHT HAND   • Diabetes mellitus     DIET CONTROLLED   • Elevated prostate specific antigen (PSA)    • GERD (gastroesophageal reflux disease)    • Glaucoma    • H/O: lung cancer    • Hiatal hernia    • Hx of renal calculi    • Hx: recurrent pneumonia    • Hyperlipemia    • Hypertension    • Non-small cell lung cancer     Stage IB   • Partial small bowel obstruction    • Prostate cancer    • Sleep apnea     USES CPAP   • Spinal stenosis    • Visual impairment     BLIND LEFT EYE; 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    • Sulfa Antibiotics Other (See Comments)     Allergic to eye drops   • Tetracycline Other (See Comments)     BURNS ON THE BACK OF BOTH HANDS      Social History     Social History   • Marital status:      Spouse name: Lianna   • Number of children: 5   • Years of education: Post Grad     Occupational History   • [employer]  Retired     Social History Main Topics   • Smoking status: Former Smoker     Packs/day: 1.50     Years: 22.00     Types: Cigarettes     Quit date: 9/2/1975   • Smokeless tobacco: Former User   •  Alcohol use 1.2 oz/week     2 Shots of liquor per week   • Drug use: No   • Sexual activity: Defer     Other Topics Concern   • Not on file     Social History Narrative        Current Outpatient Prescriptions:   •  albuterol (PROAIR HFA) 108 (90 BASE) MCG/ACT inhaler, Inhale 2 puffs every 4 (four) hours as needed for wheezing., Disp: , Rfl:   •  aspirin 81 MG tablet, Take 81 mg by mouth daily. PT TO STOP PER MD INSTRUCTION, Disp: , Rfl:   •  atropine 1 % ophthalmic solution, Administer 1 drop into the left eye daily., Disp: , Rfl:   •  cetirizine (ZyrTEC) 10 MG tablet, Take 10 mg by mouth daily., Disp: , Rfl:   •  cholecalciferol (VITAMIN D3) 1000 UNITS tablet, Take 1,000 Units by mouth daily., Disp: , Rfl:   •  Diphenhydramine-APAP, sleep, (TYLENOL PM EXTRA STRENGTH PO), Take 500 mg by mouth Every Night., Disp: , Rfl:   •  Docusate Calcium (STOOL SOFTENER PO), Take 100 mg by mouth Every Night., Disp: , Rfl:   •  losartan (COZAAR) 100 MG tablet, TAKE 1 TABLET DAILY, Disp: 90 tablet, Rfl: 3  •  pravastatin (PRAVACHOL) 40 MG tablet, Take 1 tablet by mouth Daily., Disp: 90 tablet, Rfl: 3  •  prednisoLONE acetate (PRED FORTE) 1 % ophthalmic suspension, Administer 1 drop into the left eye daily., Disp: , Rfl:   •  Probiotic Product (PROBIOTIC PO), Take  by mouth 2 (Two) Times a Day., Disp: , Rfl:   •  Psyllium (METAMUCIL PO), Take 4 capsules by mouth daily., Disp: , Rfl:   •  ranitidine (ZANTAC) 75 MG tablet, Take 75 mg by mouth every night., Disp: , Rfl:   •  levoFLOXacin (LEVAQUIN) 500 MG tablet, Take 1 tablet by mouth Daily., Disp: 7 tablet, Rfl: 0     Objective     History of Present Illness Carlito is here for follow-up on right sided rib pain.  His wife is present as well.  He was seen last week and x-rays were ordered which were negative for fracture.  A chest x-ray was also done given his history of a previous right pneumonectomy in the distant past and a recent history of surgery for removal of a carcinoid tumor.   Chest x-ray revealed a vague opacity in the perihilar region of the left hilum.  A CT scan was obtained and the radiologist felt the opacity could be inflammatory in nature.  He does have a cough and does have shortness of breath which for him has been chronic since his pneumonectomy.  Denies any fever or fatigue.    Review of Systems   Constitutional: Negative for fatigue and fever.   Respiratory: Positive for cough and shortness of breath.    Cardiovascular: Positive for chest pain.       Physical Exam   Constitutional: He appears well-developed and well-nourished.   Elderly gentleman who is alert pleasant and cooperative.   Cardiovascular: Normal rate, regular rhythm and normal heart sounds.    Pulmonary/Chest: No respiratory distress. He has no wheezes. He has no rales.   Absent right breath sounds   Musculoskeletal:   There is mild tenderness of the right anterior lower rib cage   Psychiatric: He has a normal mood and affect.   Vitals reviewed.      ASSESSMENT     Problem List Items Addressed This Visit        Nervous and Auditory    Rib pain on right side - Primary       Other    Opacity of lung on imaging study    Relevant Orders    CBC & Differential          PLAN  Patient Instructions   Reviewed and discussed results of x-rays and recent CT scan.  We'll additionally check a complete blood count today.  Start Levaquin 100 mg daily for 7 days.  We'll plan on repeating a chest x-ray in one month.    No Follow-up on file.

## 2017-12-12 NOTE — PROGRESS NOTES
QUICK REFERENCE INFORMATION:  The ABCs of the Annual Wellness Visit    Subsequent Medicare Wellness Visit    HEALTH RISK ASSESSMENT    1935    Recent Hospitalizations:  No hospitalization(s) within the last year..        Current Medical Providers:  Patient Care Team:  Alexis Denny MD as PCP - General (Internal Medicine)  Alexis Denny MD as PCP - Family Medicine  Alexis Denny MD as PCP - Claims Attributed  Luis Lowery MD PhD as Consulting Physician (Hematology and Oncology)  Josef Veloz MD as Referring Physician (General Surgery)        Smoking Status:  History   Smoking Status   • Former Smoker   • Packs/day: 1.50   • Years: 22.00   • Types: Cigarettes   • Quit date: 9/2/1975   Smokeless Tobacco   • Former User       Alcohol Consumption:  History   Alcohol Use   • 1.2 oz/week   • 2 Shots of liquor per week       Depression Screen:   PHQ-2/PHQ-9 Depression Screening 12/12/2017   Little interest or pleasure in doing things 1   Feeling down, depressed, or hopeless 1   Trouble falling or staying asleep, or sleeping too much 1   Feeling tired or having little energy 3   Poor appetite or overeating 2   Feeling bad about yourself - or that you are a failure or have let yourself or your family down 1   Trouble concentrating on things, such as reading the newspaper or watching television 1   Moving or speaking so slowly that other people could have noticed. Or the opposite - being so fidgety or restless that you have been moving around a lot more than usual 1   Thoughts that you would be better off dead, or of hurting yourself in some way 1   Total Score 12   If you checked off any problems, how difficult have these problems made it for you to do your work, take care of things at home, or get along with other people? Somewhat difficult       Health Habits and Functional and Cognitive Screening:  Functional & Cognitive Status 12/12/2017   Do you have difficulty preparing food and eating? No   Do you  have difficulty bathing yourself, getting dressed or grooming yourself? No   Do you have difficulty using the toilet? No   Do you have difficulty moving around from place to place? No   Do you have trouble with steps or getting out of a bed or a chair? No   In the past year have you fallen or experienced a near fall? No   Current Diet Diabetic Diet   Dental Exam Up to date   Eye Exam Up to date   Exercise (times per week) 3 times per week   Current Exercise Activities Include Walking   Do you need help using the phone?  No   Are you deaf or do you have serious difficulty hearing?  Yes   Do you need help with transportation? Yes   Do you need help shopping? No   Do you need help preparing meals?  No   Do you need help with housework?  No   Do you need help with laundry? No   Do you need help taking your medications? No   Do you need help managing money? No   Have you felt unusual stress, anger or loneliness in the last month? No   Who do you live with? Spouse   If you need help, do you have trouble finding someone available to you? No   Have you been bothered in the last four weeks by sexual problems? No   Do you have difficulty concentrating, remembering or making decisions? No           Does the patient have evidence of cognitive impairment? YES, IMPAIRED SHORT TERM MEMORY           Recent Lab Results:  CMP:  Lab Results   Component Value Date     (H) 09/13/2017    BUN 13 09/13/2017    CREATININE 0.80 12/08/2017    EGFRIFNONA 94 09/13/2017    EGFRIFAFRI 114 09/13/2017    BCR 16.5 09/13/2017     09/13/2017    K 4.9 09/13/2017    CO2 30.5 (H) 09/13/2017    CALCIUM 9.0 09/13/2017    PROTENTOTREF 5.8 (L) 09/13/2017    ALBUMIN 3.80 09/13/2017    LABGLOBREF 2.0 09/13/2017    LABIL2 1.9 09/13/2017    BILITOT 0.4 09/13/2017    ALKPHOS 111 09/13/2017    AST 14 09/13/2017    ALT 18 09/13/2017     Lipid Panel:  Lab Results   Component Value Date    TRIG 94 09/13/2017    HDL 63 (H) 09/13/2017    VLDL 18.8  09/13/2017    LDLHDL 0.91 09/13/2017     HbA1c:  Lab Results   Component Value Date    HGBA1C 6.52 (H) 09/13/2017         Age-appropriate Screening Schedule:  Refer to the list below for future screening recommendations based on patient's age, sex and/or medical conditions. Orders for these recommended tests are listed in the plan section. The patient has been provided with a written plan.    Health Maintenance   Topic Date Due   • DIABETIC FOOT EXAM  02/03/2016   • HEMOGLOBIN A1C  03/13/2018   • URINE MICROALBUMIN  05/10/2018   • DIABETIC EYE EXAM  07/13/2018   • LIPID PANEL  09/13/2018   • COLONOSCOPY  10/25/2021   • TDAP/TD VACCINES (3 - Td) 02/13/2027   • INFLUENZA VACCINE  Completed   • PNEUMOCOCCAL VACCINES (65+ LOW/MEDIUM RISK)  Completed   • ZOSTER VACCINE  Completed        Subjective   History of Present Illness    Carlito Hicks is a 82 y.o. male who presents for an Subsequent Wellness Visit.    The following portions of the patient's history were reviewed and updated as appropriate: allergies, current medications, past family history, past medical history, past social history, past surgical history and problem list.    Outpatient Medications Prior to Visit   Medication Sig Dispense Refill   • albuterol (PROAIR HFA) 108 (90 BASE) MCG/ACT inhaler Inhale 2 puffs every 4 (four) hours as needed for wheezing.     • aspirin 81 MG tablet Take 81 mg by mouth daily. PT TO STOP PER MD INSTRUCTION     • atropine 1 % ophthalmic solution Administer 1 drop into the left eye daily.     • cetirizine (ZyrTEC) 10 MG tablet Take 10 mg by mouth daily.     • cholecalciferol (VITAMIN D3) 1000 UNITS tablet Take 1,000 Units by mouth daily.     • Diphenhydramine-APAP, sleep, (TYLENOL PM EXTRA STRENGTH PO) Take 500 mg by mouth Every Night.     • Docusate Calcium (STOOL SOFTENER PO) Take 100 mg by mouth Every Night.     • losartan (COZAAR) 100 MG tablet TAKE 1 TABLET DAILY 90 tablet 3   • pravastatin (PRAVACHOL) 40 MG tablet Take 1  tablet by mouth Daily. 90 tablet 3   • prednisoLONE acetate (PRED FORTE) 1 % ophthalmic suspension Administer 1 drop into the left eye daily.     • Probiotic Product (PROBIOTIC PO) Take  by mouth 2 (Two) Times a Day.     • Psyllium (METAMUCIL PO) Take 4 capsules by mouth daily.     • ranitidine (ZANTAC) 75 MG tablet Take 75 mg by mouth every night.       No facility-administered medications prior to visit.        Patient Active Problem List   Diagnosis   • Type 2 diabetes mellitus   • Essential hypertension   • Hypercholesterolemia   • Hyperglycemia   • Pain of lumbar spine   • Diabetic polyneuropathy associated with type 2 diabetes mellitus   • Bowel obstruction   • Benign carcinoid tumor of the ileum   • Rib pain on right side       Advance Care Planning:  has an advance directive - a copy has been provided and is in file    Identification of Risk Factors:  Risk factors include: cardiovascular risk.    Review of Systems    Compared to one year ago, the patient feels his physical health is the same.  Compared to one year ago, the patient feels his mental health is the same.    Objective     Physical Exam    Vitals:    12/12/17 1129   PainSc: 4  Comment: ct scan done on friday   PainLoc: Rib Cage       There is no height or weight on file to calculate BMI.  Discussed the patient's BMI with him. BMI is above normal parameters. Follow-up plan includes:  no follow-up required.    Assessment/Plan   Patient Self-Management and Personalized Health Advice  The patient has been provided with information about: diet, exercise, weight management and fall prevention and preventive services including:   · Fall Risk assessment done.    Visit Diagnoses:  No diagnosis found.    No orders of the defined types were placed in this encounter.      Outpatient Encounter Prescriptions as of 12/12/2017   Medication Sig Dispense Refill   • albuterol (PROAIR HFA) 108 (90 BASE) MCG/ACT inhaler Inhale 2 puffs every 4 (four) hours as needed  for wheezing.     • aspirin 81 MG tablet Take 81 mg by mouth daily. PT TO STOP PER MD INSTRUCTION     • atropine 1 % ophthalmic solution Administer 1 drop into the left eye daily.     • cetirizine (ZyrTEC) 10 MG tablet Take 10 mg by mouth daily.     • cholecalciferol (VITAMIN D3) 1000 UNITS tablet Take 1,000 Units by mouth daily.     • Diphenhydramine-APAP, sleep, (TYLENOL PM EXTRA STRENGTH PO) Take 500 mg by mouth Every Night.     • Docusate Calcium (STOOL SOFTENER PO) Take 100 mg by mouth Every Night.     • losartan (COZAAR) 100 MG tablet TAKE 1 TABLET DAILY 90 tablet 3   • pravastatin (PRAVACHOL) 40 MG tablet Take 1 tablet by mouth Daily. 90 tablet 3   • prednisoLONE acetate (PRED FORTE) 1 % ophthalmic suspension Administer 1 drop into the left eye daily.     • Probiotic Product (PROBIOTIC PO) Take  by mouth 2 (Two) Times a Day.     • Psyllium (METAMUCIL PO) Take 4 capsules by mouth daily.     • ranitidine (ZANTAC) 75 MG tablet Take 75 mg by mouth every night.       No facility-administered encounter medications on file as of 12/12/2017.        Reviewed use of high risk medication in the elderly: yes  Reviewed for potential of harmful drug interactions in the elderly: no    Follow Up:  Will schedule a follow up to discuss CT results and continued rib pain  Follow up with Dr Denny as schedule with labs at the end of dec   An After Visit Summary and PPPS with all of these plans were given to the patient.

## 2017-12-28 PROBLEM — R60.0 BILATERAL EDEMA OF LOWER EXTREMITY: Status: ACTIVE | Noted: 2017-01-01

## 2017-12-28 PROBLEM — R10.11 RIGHT UPPER QUADRANT PAIN: Status: ACTIVE | Noted: 2017-01-01

## 2017-12-28 NOTE — TELEPHONE ENCOUNTER
Spoke to Renu and informed her that Dr. Denny did want them done at the same time, also put the correct order in the system   ----- Message from Therese Marie MA sent at 12/28/2017  3:57 PM EST -----  Contact: RENU WITH [facility] SCHEDULING  RENU WITH SCHEDULING WANTS TO KNOW IF CT OF ABDOMEN AND CT OF CHEST ARE TO BE DONE AT THE SAME TIME SHE IS ALSO STATING IT WOULD NOT LET HER SCHEDULE THE CT SCAN OF CHEST BECAUSE THE ORDERS WERE PUT IN INCORRECTLY / IT READS CHEST WITH AND WITHOUT CONTRAST/ RENU'S DIRECT PHONE NUMBER IS BELOW             [phone]

## 2017-12-28 NOTE — PROGRESS NOTES
Subjective   Carlito Hicks is a 82 y.o. male. 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   • Balance problem     DUE TO VISION TROUBLE; USES A CANE   • COPD (chronic obstructive pulmonary disease)    • DDD (degenerative disc disease), cervical     SLIGHT NEUROPATHY RIGHT HAND   • Diabetes mellitus     DIET CONTROLLED   • Elevated prostate specific antigen (PSA)    • GERD (gastroesophageal reflux disease)    • Glaucoma    • H/O: lung cancer    • Hiatal hernia    • Hx of renal calculi    • Hx: recurrent pneumonia    • Hyperlipemia    • Hypertension    • Non-small cell lung cancer     Stage IB   • Partial small bowel obstruction    • Prostate cancer    • Sleep apnea     USES CPAP   • Spinal stenosis    • Visual impairment     BLIND LEFT EYE; 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    • Sulfa Antibiotics Other (See Comments)     Allergic to eye drops   • Tetracycline Other (See Comments)     BURNS ON THE BACK OF BOTH HANDS      Social History     Social History   • Marital status:      Spouse name: Lianna   • Number of children: 5   • Years of education: Post Grad     Occupational History   • [employer]  Retired     Social History Main Topics   • Smoking status: Former Smoker     Packs/day: 1.50     Years: 22.00     Types: Cigarettes     Quit date: 9/2/1975   • Smokeless tobacco: Former User   • Alcohol use 1.2 oz/week     2 Shots of liquor per week   • Drug use: No   • Sexual activity: Defer     Other Topics Concern   • Not on file     Social History  Narrative        Current Outpatient Prescriptions:   •  albuterol (PROAIR HFA) 108 (90 BASE) MCG/ACT inhaler, Inhale 2 puffs every 4 (four) hours as needed for wheezing., Disp: , Rfl:   •  aspirin 81 MG tablet, Take 81 mg by mouth daily. PT TO STOP PER MD INSTRUCTION, Disp: , Rfl:   •  atropine 1 % ophthalmic solution, Administer 1 drop into the left eye daily., Disp: , Rfl:   •  cetirizine (ZyrTEC) 10 MG tablet, Take 10 mg by mouth daily., Disp: , Rfl:   •  cholecalciferol (VITAMIN D3) 1000 UNITS tablet, Take 1,000 Units by mouth daily., Disp: , Rfl:   •  Diphenhydramine-APAP, sleep, (TYLENOL PM EXTRA STRENGTH PO), Take 500 mg by mouth Every Night., Disp: , Rfl:   •  Docusate Calcium (STOOL SOFTENER PO), Take 100 mg by mouth Every Night., Disp: , Rfl:   •  losartan (COZAAR) 100 MG tablet, TAKE 1 TABLET DAILY, Disp: 90 tablet, Rfl: 3  •  pravastatin (PRAVACHOL) 40 MG tablet, Take 1 tablet by mouth Daily., Disp: 90 tablet, Rfl: 3  •  prednisoLONE acetate (PRED FORTE) 1 % ophthalmic suspension, Administer 1 drop into the left eye daily., Disp: , Rfl:   •  Probiotic Product (PROBIOTIC PO), Take  by mouth 2 (Two) Times a Day., Disp: , Rfl:   •  Psyllium (METAMUCIL PO), Take 4 capsules by mouth daily., Disp: , Rfl:   •  ranitidine (ZANTAC) 75 MG tablet, Take 75 mg by mouth every night., Disp: , Rfl:   •  hydrochlorothiazide (HYDRODIURIL) 12.5 MG tablet, Take 1 tablet by mouth Daily., Disp: 90 tablet, Rfl: 1     Objective     HPI Comments: He is here to follow-up on some lab work done last week follow-up on hypertension, hypercholesterolemia and type 2 diabetes.    A few months ago he noticed of the acute onset of right upper quadrant abdominal pain.  This issue has been persistent.  He denies any GI complaints.  The pain that he gets his fleeting severe.  It has been several times a day.        Hypertension     Hyperlipidemia     Diabetes          Review of Systems   Constitutional: Negative.    HENT: Negative.     Respiratory: Negative.    Cardiovascular: Negative.    Musculoskeletal:        He complains of fleeting, frequent, right upper quadrant abdominal pain.  No associated nausea vomiting diaphoresis.  No radiation of this pain.  No worsening with food.   Neurological: Negative.    Psychiatric/Behavioral: Negative.        Physical Exam   Constitutional: He is oriented to person, place, and time. He appears well-developed and well-nourished.   HENT:   Head: Normocephalic and atraumatic.   Cardiovascular: Normal rate, regular rhythm and normal heart sounds.    Pulmonary/Chest: Effort normal and breath sounds normal.   Abdominal:   His abdomen is soft, nontender to percussion or palpation, no guarding.    No pain on palpation of his right costal margin.  No apparent abdominal wall defect/hernia.   Musculoskeletal:   He has trace edema bilateral lower extremities.   Neurological: He is alert and oriented to person, place, and time.   Psychiatric: He has a normal mood and affect. His behavior is normal.   Nursing note and vitals reviewed.        Problem List Items Addressed This Visit        Cardiovascular and Mediastinum    Essential hypertension    Relevant Medications    hydrochlorothiazide (HYDRODIURIL) 12.5 MG tablet    Hypercholesterolemia - Primary       Endocrine    Type 2 diabetes mellitus       Nervous and Auditory    Rib pain on right side    Right upper quadrant pain       Other    Bilateral edema of lower extremity            PLAN  I gave him a prescription for hydrochlorothiazide 12.5 mg 1 by mouth every morning when necessary bilateral lower extremity edema.  He has a trace of edema in his feet and I think this is probably most likely dependent edema.    His hypertension is well-controlled.    His hypercholesterolemia is well-controlled.    His type 2 diabetes shows excellent control.    He has right rib pain and right upper quadrant pain.  He is due for a CT scan of his chest with and without contrast after  the second week in January to follow-up on a pulmonary consolidation in the left lower lobe.  At that time, I would like him to get a CT of his abdomen with and without contrast to begin to sort out his abdominal pain and right rib pain.  I feel the most likely diagnosis in this case however is musculoskeletal pain.  No Follow-up on file.

## 2018-01-01 ENCOUNTER — DOCUMENTATION (OUTPATIENT)
Dept: ONCOLOGY | Facility: CLINIC | Age: 83
End: 2018-01-01

## 2018-01-01 ENCOUNTER — TELEPHONE (OUTPATIENT)
Dept: RADIATION ONCOLOGY | Facility: HOSPITAL | Age: 83
End: 2018-01-01

## 2018-01-01 ENCOUNTER — RADIATION ONCOLOGY WEEKLY ASSESSMENT (OUTPATIENT)
Dept: RADIATION ONCOLOGY | Facility: HOSPITAL | Age: 83
End: 2018-01-01

## 2018-01-01 ENCOUNTER — DOCUMENTATION (OUTPATIENT)
Dept: RADIATION ONCOLOGY | Facility: HOSPITAL | Age: 83
End: 2018-01-01

## 2018-01-01 ENCOUNTER — TELEPHONE (OUTPATIENT)
Dept: FAMILY MEDICINE CLINIC | Facility: CLINIC | Age: 83
End: 2018-01-01

## 2018-01-01 ENCOUNTER — APPOINTMENT (OUTPATIENT)
Dept: RADIATION ONCOLOGY | Facility: HOSPITAL | Age: 83
End: 2018-01-01

## 2018-01-01 ENCOUNTER — OFFICE VISIT (OUTPATIENT)
Dept: FAMILY MEDICINE CLINIC | Facility: CLINIC | Age: 83
End: 2018-01-01

## 2018-01-01 ENCOUNTER — RADIATION ONCOLOGY WEEKLY ASSESSMENT (OUTPATIENT)
Dept: RADIATION ONCOLOGY | Facility: CLINIC | Age: 83
End: 2018-01-01

## 2018-01-01 ENCOUNTER — HOSPITAL ENCOUNTER (OUTPATIENT)
Dept: CT IMAGING | Facility: HOSPITAL | Age: 83
Discharge: HOME | End: 2018-01-17
Admitting: INTERNAL MEDICINE

## 2018-01-01 ENCOUNTER — OFFICE VISIT (OUTPATIENT)
Dept: ONCOLOGY | Facility: CLINIC | Age: 83
End: 2018-01-01

## 2018-01-01 ENCOUNTER — CONSULT (OUTPATIENT)
Dept: RADIATION ONCOLOGY | Facility: HOSPITAL | Age: 83
End: 2018-01-01

## 2018-01-01 ENCOUNTER — APPOINTMENT (OUTPATIENT)
Dept: OTHER | Facility: HOSPITAL | Age: 83
End: 2018-01-01

## 2018-01-01 ENCOUNTER — OFFICE VISIT (OUTPATIENT)
Dept: RADIATION ONCOLOGY | Facility: HOSPITAL | Age: 83
End: 2018-01-01

## 2018-01-01 ENCOUNTER — LAB (OUTPATIENT)
Dept: LAB | Facility: HOSPITAL | Age: 83
End: 2018-01-01

## 2018-01-01 ENCOUNTER — TELEPHONE (OUTPATIENT)
Dept: RADIATION ONCOLOGY | Facility: CLINIC | Age: 83
End: 2018-01-01

## 2018-01-01 VITALS
HEART RATE: 92 BPM | OXYGEN SATURATION: 96 % | WEIGHT: 188 LBS | TEMPERATURE: 98.4 F | DIASTOLIC BLOOD PRESSURE: 73 MMHG | BODY MASS INDEX: 28.17 KG/M2 | SYSTOLIC BLOOD PRESSURE: 112 MMHG

## 2018-01-01 VITALS
OXYGEN SATURATION: 95 % | SYSTOLIC BLOOD PRESSURE: 145 MMHG | TEMPERATURE: 97 F | DIASTOLIC BLOOD PRESSURE: 88 MMHG | BODY MASS INDEX: 28.17 KG/M2 | RESPIRATION RATE: 16 BRPM | WEIGHT: 188 LBS | HEART RATE: 80 BPM

## 2018-01-01 VITALS
OXYGEN SATURATION: 96 % | HEART RATE: 91 BPM | TEMPERATURE: 98.1 F | HEIGHT: 69 IN | DIASTOLIC BLOOD PRESSURE: 60 MMHG | BODY MASS INDEX: 26.78 KG/M2 | WEIGHT: 180.8 LBS | RESPIRATION RATE: 16 BRPM | SYSTOLIC BLOOD PRESSURE: 120 MMHG

## 2018-01-01 VITALS
DIASTOLIC BLOOD PRESSURE: 68 MMHG | TEMPERATURE: 98.6 F | HEART RATE: 98 BPM | BODY MASS INDEX: 27.11 KG/M2 | RESPIRATION RATE: 16 BRPM | WEIGHT: 183 LBS | SYSTOLIC BLOOD PRESSURE: 115 MMHG | HEIGHT: 69 IN | OXYGEN SATURATION: 98 %

## 2018-01-01 VITALS
RESPIRATION RATE: 16 BRPM | BODY MASS INDEX: 27.46 KG/M2 | HEART RATE: 69 BPM | OXYGEN SATURATION: 94 % | SYSTOLIC BLOOD PRESSURE: 138 MMHG | DIASTOLIC BLOOD PRESSURE: 81 MMHG | HEIGHT: 69 IN | WEIGHT: 185.4 LBS | TEMPERATURE: 97.6 F

## 2018-01-01 VITALS
DIASTOLIC BLOOD PRESSURE: 78 MMHG | HEART RATE: 90 BPM | RESPIRATION RATE: 16 BRPM | SYSTOLIC BLOOD PRESSURE: 131 MMHG | OXYGEN SATURATION: 93 % | TEMPERATURE: 97.4 F

## 2018-01-01 VITALS
WEIGHT: 185 LBS | TEMPERATURE: 97.2 F | OXYGEN SATURATION: 96 % | HEIGHT: 69 IN | BODY MASS INDEX: 27.4 KG/M2 | DIASTOLIC BLOOD PRESSURE: 80 MMHG | HEART RATE: 92 BPM | SYSTOLIC BLOOD PRESSURE: 128 MMHG

## 2018-01-01 VITALS
DIASTOLIC BLOOD PRESSURE: 78 MMHG | TEMPERATURE: 97.4 F | BODY MASS INDEX: 27.46 KG/M2 | OXYGEN SATURATION: 94 % | SYSTOLIC BLOOD PRESSURE: 120 MMHG | WEIGHT: 185.41 LBS | HEIGHT: 69 IN | HEART RATE: 73 BPM

## 2018-01-01 VITALS
BODY MASS INDEX: 27.05 KG/M2 | HEIGHT: 69 IN | HEART RATE: 78 BPM | WEIGHT: 182.6 LBS | TEMPERATURE: 98.4 F | DIASTOLIC BLOOD PRESSURE: 70 MMHG | SYSTOLIC BLOOD PRESSURE: 110 MMHG | RESPIRATION RATE: 16 BRPM | OXYGEN SATURATION: 95 %

## 2018-01-01 VITALS
RESPIRATION RATE: 16 BRPM | HEART RATE: 86 BPM | SYSTOLIC BLOOD PRESSURE: 119 MMHG | OXYGEN SATURATION: 94 % | DIASTOLIC BLOOD PRESSURE: 72 MMHG

## 2018-01-01 VITALS
SYSTOLIC BLOOD PRESSURE: 129 MMHG | BODY MASS INDEX: 27.11 KG/M2 | HEIGHT: 69 IN | RESPIRATION RATE: 16 BRPM | DIASTOLIC BLOOD PRESSURE: 78 MMHG | OXYGEN SATURATION: 93 % | TEMPERATURE: 96.8 F | WEIGHT: 183 LBS | HEART RATE: 89 BPM

## 2018-01-01 DIAGNOSIS — R07.81 RIB PAIN ON RIGHT SIDE: Primary | ICD-10-CM

## 2018-01-01 DIAGNOSIS — D3A.012 BENIGN CARCINOID TUMOR OF ILEUM: Primary | ICD-10-CM

## 2018-01-01 DIAGNOSIS — E11.42 TYPE 2 DIABETES MELLITUS WITH DIABETIC POLYNEUROPATHY, WITHOUT LONG-TERM CURRENT USE OF INSULIN (HCC): ICD-10-CM

## 2018-01-01 DIAGNOSIS — I10 ESSENTIAL HYPERTENSION: ICD-10-CM

## 2018-01-01 DIAGNOSIS — C34.32 MALIGNANT NEOPLASM OF LOWER LOBE OF LEFT LUNG (HCC): Primary | ICD-10-CM

## 2018-01-01 DIAGNOSIS — C34.91 ADENOCARCINOMA OF RIGHT LUNG (HCC): ICD-10-CM

## 2018-01-01 DIAGNOSIS — C34.91 ADENOCARCINOMA OF RIGHT LUNG (HCC): Primary | ICD-10-CM

## 2018-01-01 DIAGNOSIS — E78.00 HYPERCHOLESTEROLEMIA: Primary | ICD-10-CM

## 2018-01-01 DIAGNOSIS — G89.3 CANCER ASSOCIATED PAIN: ICD-10-CM

## 2018-01-01 DIAGNOSIS — M54.50 PAIN OF LUMBAR SPINE: ICD-10-CM

## 2018-01-01 DIAGNOSIS — M54.50 PAIN OF LUMBAR SPINE: Primary | ICD-10-CM

## 2018-01-01 DIAGNOSIS — E78.00 HYPERCHOLESTEROLEMIA: ICD-10-CM

## 2018-01-01 DIAGNOSIS — R91.8 OPACITY OF LUNG ON IMAGING STUDY: ICD-10-CM

## 2018-01-01 LAB
ALBUMIN SERPL-MCNC: 3.8 G/DL (ref 3.5–5.2)
ALBUMIN/GLOB SERPL: 1.6 G/DL
ALP SERPL-CCNC: 118 U/L (ref 39–117)
ALT SERPL-CCNC: 13 U/L (ref 1–41)
AST SERPL-CCNC: 11 U/L (ref 1–40)
BASOPHILS # BLD AUTO: 0.02 10*3/MM3 (ref 0–0.2)
BASOPHILS # BLD AUTO: 0.03 10*3/MM3 (ref 0–0.1)
BASOPHILS # BLD AUTO: 0.04 10*3/MM3 (ref 0–0.2)
BASOPHILS NFR BLD AUTO: 0.3 % (ref 0–1.5)
BASOPHILS NFR BLD AUTO: 0.5 % (ref 0–1.1)
BASOPHILS NFR BLD AUTO: 0.5 % (ref 0–1.5)
BILIRUB SERPL-MCNC: 0.3 MG/DL (ref 0.1–1.2)
BUN SERPL-MCNC: 19 MG/DL (ref 8–23)
BUN/CREAT SERPL: 25.7 (ref 7–25)
CALCIUM SERPL-MCNC: 9.4 MG/DL (ref 8.6–10.5)
CHLORIDE SERPL-SCNC: 100 MMOL/L (ref 98–107)
CHOLEST SERPL-MCNC: 140 MG/DL (ref 0–200)
CO2 SERPL-SCNC: 29.4 MMOL/L (ref 22–29)
CREAT BLDA-MCNC: 0.8 MG/DL (ref 0.6–1.3)
CREAT SERPL-MCNC: 0.74 MG/DL (ref 0.76–1.27)
DEPRECATED RDW RBC AUTO: 45.6 FL (ref 37–54)
DEPRECATED RDW RBC AUTO: 46.8 FL (ref 37–49)
EOSINOPHIL # BLD AUTO: 0.03 10*3/MM3 (ref 0–0.36)
EOSINOPHIL # BLD AUTO: 0.13 10*3/MM3 (ref 0–0.7)
EOSINOPHIL # BLD AUTO: 0.15 10*3/MM3 (ref 0–0.7)
EOSINOPHIL NFR BLD AUTO: 0.5 % (ref 1–5)
EOSINOPHIL NFR BLD AUTO: 1.5 % (ref 0.3–6.2)
EOSINOPHIL NFR BLD AUTO: 2.5 % (ref 0.3–6.2)
ERYTHROCYTE [DISTWIDTH] IN BLOOD BY AUTOMATED COUNT: 13.2 % (ref 11.5–14.5)
ERYTHROCYTE [DISTWIDTH] IN BLOOD BY AUTOMATED COUNT: 13.2 % (ref 11.7–14.5)
ERYTHROCYTE [DISTWIDTH] IN BLOOD BY AUTOMATED COUNT: 14.4 % (ref 11.5–14.5)
GLOBULIN SER CALC-MCNC: 2.4 GM/DL
GLUCOSE SERPL-MCNC: 149 MG/DL (ref 65–99)
HBA1C MFR BLD: 7.13 % (ref 4.8–5.6)
HCT VFR BLD AUTO: 41.6 % (ref 40.4–52.2)
HCT VFR BLD AUTO: 41.7 % (ref 40–49)
HCT VFR BLD AUTO: 44.1 % (ref 40.4–52.2)
HDLC SERPL-MCNC: 61 MG/DL (ref 40–60)
HGB BLD-MCNC: 13.8 G/DL (ref 13.5–16.5)
HGB BLD-MCNC: 14.3 G/DL (ref 13.7–17.6)
HGB BLD-MCNC: 14.4 G/DL (ref 13.7–17.6)
IMM GRANULOCYTES # BLD: 0.02 10*3/MM3 (ref 0–0.03)
IMM GRANULOCYTES # BLD: 0.04 10*3/MM3 (ref 0–0.03)
IMM GRANULOCYTES # BLD: 0.05 10*3/MM3 (ref 0–0.03)
IMM GRANULOCYTES NFR BLD: 0.3 % (ref 0–0.5)
IMM GRANULOCYTES NFR BLD: 0.6 % (ref 0–0.5)
IMM GRANULOCYTES NFR BLD: 0.6 % (ref 0–0.5)
LDLC SERPL CALC-MCNC: 62 MG/DL (ref 0–100)
LDLC/HDLC SERPL: 1.01 {RATIO}
LYMPHOCYTES # BLD AUTO: 0.69 10*3/MM3 (ref 1–3.5)
LYMPHOCYTES # BLD AUTO: 0.72 10*3/MM3 (ref 0.9–4.8)
LYMPHOCYTES # BLD AUTO: 1.87 10*3/MM3 (ref 0.9–4.8)
LYMPHOCYTES NFR BLD AUTO: 10.4 % (ref 20–49)
LYMPHOCYTES NFR BLD AUTO: 12.2 % (ref 19.6–45.3)
LYMPHOCYTES NFR BLD AUTO: 21.4 % (ref 19.6–45.3)
MCH RBC QN AUTO: 31.8 PG (ref 27–33)
MCH RBC QN AUTO: 32.7 PG (ref 27–32.7)
MCH RBC QN AUTO: 32.9 PG (ref 27–32.7)
MCHC RBC AUTO-ENTMCNC: 32.4 G/DL (ref 32.6–36.4)
MCHC RBC AUTO-ENTMCNC: 33.1 G/DL (ref 32–35)
MCHC RBC AUTO-ENTMCNC: 34.6 G/DL (ref 32.6–36.4)
MCV RBC AUTO: 101.4 FL (ref 79.8–96.2)
MCV RBC AUTO: 94.3 FL (ref 79.8–96.2)
MCV RBC AUTO: 96.1 FL (ref 83–97)
MICROALBUMIN UR-MCNC: 3.7 UG/ML
MONOCYTES # BLD AUTO: 0.37 10*3/MM3 (ref 0.2–1.2)
MONOCYTES # BLD AUTO: 0.54 10*3/MM3 (ref 0.25–0.8)
MONOCYTES # BLD AUTO: 0.72 10*3/MM3 (ref 0.2–1.2)
MONOCYTES NFR BLD AUTO: 6.3 % (ref 5–12)
MONOCYTES NFR BLD AUTO: 8.1 % (ref 4–12)
MONOCYTES NFR BLD AUTO: 8.2 % (ref 5–12)
NEUTROPHILS # BLD AUTO: 4.63 10*3/MM3 (ref 1.9–8.1)
NEUTROPHILS # BLD AUTO: 5.33 10*3/MM3 (ref 1.5–7)
NEUTROPHILS # BLD AUTO: 5.93 10*3/MM3 (ref 1.9–8.1)
NEUTROPHILS NFR BLD AUTO: 67.8 % (ref 42.7–76)
NEUTROPHILS NFR BLD AUTO: 78.7 % (ref 42.7–76)
NEUTROPHILS NFR BLD AUTO: 79.9 % (ref 39–75)
NRBC BLD MANUAL-RTO: 0 /100 WBC (ref 0–0)
NRBC BLD MANUAL-RTO: 0 /100 WBC (ref 0–0)
PLATELET # BLD AUTO: 193 10*3/MM3 (ref 150–375)
PLATELET # BLD AUTO: 217 10*3/MM3 (ref 140–500)
PLATELET # BLD AUTO: 225 10*3/MM3 (ref 140–500)
PMV BLD AUTO: 10.1 FL (ref 8.9–12.1)
PMV BLD AUTO: 10.2 FL (ref 6–12)
POTASSIUM SERPL-SCNC: 4.5 MMOL/L (ref 3.5–5.2)
PROT SERPL-MCNC: 6.2 G/DL (ref 6–8.5)
RBC # BLD AUTO: 4.34 10*6/MM3 (ref 4.3–5.5)
RBC # BLD AUTO: 4.35 10*6/MM3 (ref 4.6–6)
RBC # BLD AUTO: 4.41 10*6/MM3 (ref 4.6–6)
SODIUM SERPL-SCNC: 140 MMOL/L (ref 136–145)
TRIGL SERPL-MCNC: 87 MG/DL (ref 0–150)
VLDLC SERPL CALC-MCNC: 17.4 MG/DL (ref 5–40)
WBC # BLD AUTO: 5.89 10*3/MM3 (ref 4.5–10.7)
WBC NRBC COR # BLD: 6.66 10*3/MM3 (ref 4–10)
WBC NRBC COR # BLD: 8.74 10*3/MM3 (ref 4.5–10.7)

## 2018-01-01 PROCEDURE — 77014 CHG CT GUIDANCE RADIATION THERAPY FLDS PLACEMENT: CPT | Performed by: RADIOLOGY

## 2018-01-01 PROCEDURE — 77300 RADIATION THERAPY DOSE PLAN: CPT | Performed by: RADIOLOGY

## 2018-01-01 PROCEDURE — 77386: CPT | Performed by: RADIOLOGY

## 2018-01-01 PROCEDURE — 77412 RADIATION TX DELIVERY LVL 3: CPT | Performed by: RADIOLOGY

## 2018-01-01 PROCEDURE — 85025 COMPLETE CBC W/AUTO DIFF WBC: CPT | Performed by: INTERNAL MEDICINE

## 2018-01-01 PROCEDURE — 99204 OFFICE O/P NEW MOD 45 MIN: CPT | Performed by: RADIOLOGY

## 2018-01-01 PROCEDURE — 82565 ASSAY OF CREATININE: CPT

## 2018-01-01 PROCEDURE — 77338 DESIGN MLC DEVICE FOR IMRT: CPT | Performed by: RADIOLOGY

## 2018-01-01 PROCEDURE — 36415 COLL VENOUS BLD VENIPUNCTURE: CPT

## 2018-01-01 PROCEDURE — 77417 THER RADIOLOGY PORT IMAGE(S): CPT | Performed by: RADIOLOGY

## 2018-01-01 PROCEDURE — 77295 3-D RADIOTHERAPY PLAN: CPT | Performed by: RADIOLOGY

## 2018-01-01 PROCEDURE — 77290 THER RAD SIMULAJ FIELD CPLX: CPT | Performed by: RADIOLOGY

## 2018-01-01 PROCEDURE — 77427 RADIATION TX MANAGEMENT X5: CPT | Performed by: RADIOLOGY

## 2018-01-01 PROCEDURE — 77334 RADIATION TREATMENT AID(S): CPT | Performed by: RADIOLOGY

## 2018-01-01 PROCEDURE — 77336 RADIATION PHYSICS CONSULT: CPT | Performed by: RADIOLOGY

## 2018-01-01 PROCEDURE — 77370 RADIATION PHYSICS CONSULT: CPT | Performed by: RADIOLOGY

## 2018-01-01 PROCEDURE — 99215 OFFICE O/P EST HI 40 MIN: CPT | Performed by: RADIOLOGY

## 2018-01-01 PROCEDURE — 74160 CT ABDOMEN W/CONTRAST: CPT

## 2018-01-01 PROCEDURE — 99215 OFFICE O/P EST HI 40 MIN: CPT | Performed by: INTERNAL MEDICINE

## 2018-01-01 PROCEDURE — 77263 THER RADIOLOGY TX PLNG CPLX: CPT | Performed by: RADIOLOGY

## 2018-01-01 PROCEDURE — 77280 THER RAD SIMULAJ FIELD SMPL: CPT | Performed by: RADIOLOGY

## 2018-01-01 PROCEDURE — 77301 RADIOTHERAPY DOSE PLAN IMRT: CPT | Performed by: RADIOLOGY

## 2018-01-01 PROCEDURE — 77470 SPECIAL RADIATION TREATMENT: CPT | Performed by: RADIOLOGY

## 2018-01-01 PROCEDURE — 0 DIATRIZOATE MEGLUMINE & SODIUM PER 1 ML: Performed by: INTERNAL MEDICINE

## 2018-01-01 PROCEDURE — 99214 OFFICE O/P EST MOD 30 MIN: CPT | Performed by: INTERNAL MEDICINE

## 2018-01-01 PROCEDURE — 71260 CT THORAX DX C+: CPT

## 2018-01-01 PROCEDURE — G0463 HOSPITAL OUTPT CLINIC VISIT: HCPCS | Performed by: RADIOLOGY

## 2018-01-01 PROCEDURE — 77293 RESPIRATOR MOTION MGMT SIMUL: CPT | Performed by: RADIOLOGY

## 2018-01-01 PROCEDURE — 36415 COLL VENOUS BLD VENIPUNCTURE: CPT | Performed by: INTERNAL MEDICINE

## 2018-01-01 PROCEDURE — 0 IOPAMIDOL 61 % SOLUTION: Performed by: INTERNAL MEDICINE

## 2018-01-01 RX ORDER — HYDROCHLOROTHIAZIDE 12.5 MG/1
12.5 TABLET ORAL DAILY
Qty: 90 TABLET | Refills: 2 | Status: SHIPPED | OUTPATIENT
Start: 2018-01-01

## 2018-01-01 RX ORDER — MELOXICAM 15 MG/1
TABLET ORAL
COMMUNITY
Start: 2018-01-01 | End: 2018-01-01

## 2018-01-01 RX ORDER — HYDROCHLOROTHIAZIDE 12.5 MG/1
TABLET ORAL
Qty: 30 TABLET | Refills: 5 | Status: SHIPPED | OUTPATIENT
Start: 2018-01-01 | End: 2018-01-01 | Stop reason: SDUPTHER

## 2018-01-01 RX ORDER — ALPRAZOLAM 0.5 MG/1
0.5 TABLET ORAL 3 TIMES DAILY PRN
Qty: 30 TABLET | Refills: 0 | OUTPATIENT
Start: 2018-01-01

## 2018-01-01 RX ORDER — HYDROCODONE BITARTRATE AND ACETAMINOPHEN 5; 325 MG/1; MG/1
1 TABLET ORAL EVERY 4 HOURS PRN
Qty: 180 TABLET | Refills: 0 | Status: SHIPPED | OUTPATIENT
Start: 2018-01-01 | End: 2018-01-01 | Stop reason: SDUPTHER

## 2018-01-01 RX ORDER — ONDANSETRON HYDROCHLORIDE 8 MG/1
8 TABLET, FILM COATED ORAL EVERY 8 HOURS PRN
Qty: 20 TABLET | Refills: 2 | Status: SHIPPED | OUTPATIENT
Start: 2018-01-01

## 2018-01-01 RX ORDER — LOSARTAN POTASSIUM 100 MG/1
TABLET ORAL
Qty: 90 TABLET | Refills: 3 | Status: SHIPPED | OUTPATIENT
Start: 2018-01-01

## 2018-01-01 RX ORDER — ALPRAZOLAM 0.5 MG/1
0.5 TABLET ORAL 2 TIMES DAILY PRN
COMMUNITY
End: 2018-01-01

## 2018-01-01 RX ORDER — ALPRAZOLAM 0.5 MG/1
0.5 TABLET ORAL 3 TIMES DAILY PRN
Qty: 30 TABLET | Refills: 0 | OUTPATIENT
Start: 2018-01-01 | End: 2018-01-01 | Stop reason: SDUPTHER

## 2018-01-01 RX ORDER — OXYCODONE AND ACETAMINOPHEN 7.5; 325 MG/1; MG/1
1-2 TABLET ORAL EVERY 4 HOURS PRN
Qty: 60 TABLET | Refills: 0 | Status: SHIPPED | OUTPATIENT
Start: 2018-01-01 | End: 2018-01-01

## 2018-01-01 RX ORDER — HYDROCODONE BITARTRATE AND ACETAMINOPHEN 5; 325 MG/1; MG/1
1 TABLET ORAL EVERY 4 HOURS PRN
Qty: 180 TABLET | Refills: 0 | Status: SHIPPED | OUTPATIENT
Start: 2018-01-01 | End: 2018-01-01

## 2018-01-01 RX ORDER — HYDROCHLOROTHIAZIDE 12.5 MG/1
TABLET ORAL
Qty: 30 TABLET | Refills: 0 | Status: SHIPPED | OUTPATIENT
Start: 2018-01-01 | End: 2018-01-01 | Stop reason: SDUPTHER

## 2018-01-01 RX ORDER — HYDROCODONE BITARTRATE AND ACETAMINOPHEN 5; 325 MG/1; MG/1
TABLET ORAL
COMMUNITY
Start: 2018-01-01 | End: 2018-01-01 | Stop reason: SDUPTHER

## 2018-01-01 RX ORDER — HYDROCODONE BITARTRATE AND ACETAMINOPHEN 5; 325 MG/1; MG/1
1 TABLET ORAL EVERY 4 HOURS PRN
Qty: 180 TABLET | Refills: 0 | Status: SHIPPED | OUTPATIENT
Start: 2018-01-01

## 2018-01-01 RX ORDER — PRAVASTATIN SODIUM 40 MG
TABLET ORAL
Qty: 90 TABLET | Refills: 3 | Status: SHIPPED | OUTPATIENT
Start: 2018-01-01

## 2018-01-01 RX ORDER — TIZANIDINE 4 MG/1
TABLET ORAL
COMMUNITY
Start: 2018-01-01 | End: 2018-01-01

## 2018-01-01 RX ADMIN — IOPAMIDOL 85 ML: 612 INJECTION, SOLUTION INTRAVENOUS at 08:40

## 2018-01-01 RX ADMIN — DIATRIZOATE MEGLUMINE AND DIATRIZOATE SODIUM 30 ML: 660; 100 LIQUID ORAL; RECTAL at 08:00

## 2018-01-29 NOTE — TELEPHONE ENCOUNTER
----- Message from Therese Marie MA sent at 1/19/2018  3:02 PM EST -----  Contact: PT  PT CALLED TO GET APPT WITH DR WOODS TO GO OVER CT SCANS I GAVE HIM EARLIEST APPT WE HAD WHICH IS THE 29TH HE SAID THIS IS UNACCEPTABLE AND THIS IS AN URGENT MATTER HE WOULD LIKE TO BE WORKED IN / SENDING THIS TO YOU BECAUSE I WAS TOLD TO NOT SEND ANYTHING TO [facility] TODAY PT CAN BE REACHED [phone]                MESSAGE WAS PRINTED AND GIVEN TO DR DE LA TORRE.

## 2018-04-16 PROBLEM — C34.91 ADENOCARCINOMA OF RIGHT LUNG (HCC): Status: ACTIVE | Noted: 2018-01-01

## 2018-04-16 NOTE — PROGRESS NOTES
DIAGNOSIS and REASON FOR CONSULTATION: Rib pain on right side    Opacity of lung on imaging study    Adenocarcinoma of right lung - for advice and recommendations regarding the diagnosis      Referring Provider:  Marcos Woods MD  Patient Care Team:  Alexis Denny MD as PCP - General (Internal Medicine)  Alexis Denny MD as PCP - Family Medicine  Alexis Denny MD as PCP - Claims Attributed  Luis Lowery MD PhD as Consulting Physician (Hematology and Oncology)  Josef Veloz MD as Referring Physician (General Surgery)  Chanelle Flaherty MD as Consulting Physician (Radiation Oncology)    CHIEF COMPLAINT:  For advice and recommendations regarding Rib pain on right side    Opacity of lung on imaging study    Adenocarcinoma of right lung  HISTORY OF PRESENT ILLNESS:  The patient is a 82 y.o. 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  He additionally also has a renal abnormality which is within followed by imaging every 6 months.    He presented with some right-sided rib and chest pain in late 2017 and plain films of the ribs on November 29, 2017 showed no acute fracture but a questionable opacity in the left perihilar region measuring 2.4 x 1.2 cm.  Follow-up CAT scan of the chest on December 8, 2017 showed the right pneumonectomy changes and a nodule in the left lower lobe measuring 2.6 x 2.4 cm which was new.  No additional lymphadenopathy was appreciated.    Follow-up CAT scan on January 17, 2018 again showed this opacity measuring 3.1 cm, increased from December and no other significant abnormality.  He underwent bronchoscopy on February 6, 2018 and bronchial brushings, washings and lavage were all negative for malignant cells.  Pulmonary function tests  completed on February 9, 2018 showed an FVC of 1.82 L, FEV1 of 1.42 L and an FEV1 over FEC of 78%.    He underwent a PET scan on March 12, 2018 which showed moderately intense uptake in the left lower lobe lesion with an SUV of 4 within the 4 cm mass which again has enlarged compared to previous scans.  Additional increased uptake was noted in the posterior right lower chest abutting the chest wall corresponding to a soft tissue density on CT scan between the posterior seventh and eighth ribs with an SUV of 10.3.  No abnormal mediastinal or hilar lymphadenopathy was appreciated.    He then underwent a CT-guided biopsy of the right chest wall lesion on April 5, 2018 which returned positive for a moderately differentiated pulmonary adenocarcinoma.  I was asked to see the patient at the request of the referring provider noted above for advice and recommendations regarding this diagnosis.     Clinically, he is still having significant pain thru the right lower chest and is taking hydrocodone every 4 hours. He denies any change in his respiratory status, dysphagia, weight loss, cough or hemoptysis. He is concerned with treatment of both abnormalities and will see the [facility] physicians again later this week.    Past Medical History: he  has a past medical history of Arthritis; Asthma; Back pain; Bacterial infection; Balance problem; COPD (chronic obstructive pulmonary disease); DDD (degenerative disc disease), cervical; Diabetes mellitus; Elevated prostate specific antigen (PSA); GERD (gastroesophageal reflux disease); Glaucoma; H/O: lung cancer (2006); Hiatal hernia; renal calculi; recurrent pneumonia; Hyperlipemia; Hypertension; Non-small cell lung cancer; Partial small bowel obstruction; Prostate cancer; Sleep apnea; Spinal stenosis; and Visual impairment.    Past Surgical History:  he has a past surgical history that includes Appendectomy; Eye surgery; Hernia repair; Lung surgery (09/25/2008); Other surgical history;  Wrist surgery; Colonoscopy (10/25/2011); Esophagoscopy / EGD (11/13/2013); Tonsillectomy; Nasal septum surgery; cystoscopy bladder stone lithotripsy (2007); Thoracotomy (2006); Colectomy (Right, 9/8/2016); Prostate surgery (2004); Vitrectomy (Left, 12/2014); and Trabeculectomy (Left, 12/2014).    Meds:    Current Outpatient Prescriptions:   •  albuterol (PROAIR HFA) 108 (90 BASE) MCG/ACT inhaler, Inhale 2 puffs every 4 (four) hours as needed for wheezing., Disp: , Rfl:   •  aspirin 81 MG tablet, Take 81 mg by mouth daily. PT TO STOP PER MD INSTRUCTION, Disp: , Rfl:   •  atropine 1 % ophthalmic solution, Administer 1 drop into the left eye daily., Disp: , Rfl:   •  cetirizine (ZyrTEC) 10 MG tablet, Take 10 mg by mouth daily., Disp: , Rfl:   •  cholecalciferol (VITAMIN D3) 1000 UNITS tablet, Take 1,000 Units by mouth daily., Disp: , Rfl:   •  Diphenhydramine-APAP, sleep, (TYLENOL PM EXTRA STRENGTH PO), Take 500 mg by mouth Every Night., Disp: , Rfl:   •  Docusate Calcium (STOOL SOFTENER PO), Take 100 mg by mouth Every Night., Disp: , Rfl:   •  hydrochlorothiazide (HYDRODIURIL) 12.5 MG tablet, Take 1 tablet by mouth Daily., Disp: 90 tablet, Rfl: 1  •  HYDROcodone-acetaminophen (NORCO) 5-325 MG per tablet, , Disp: , Rfl:   •  losartan (COZAAR) 100 MG tablet, TAKE 1 TABLET DAILY, Disp: 90 tablet, Rfl: 3  •  meloxicam (MOBIC) 15 MG tablet, , Disp: , Rfl:   •  oxyCODONE-acetaminophen (PERCOCET) 7.5-325 MG per tablet, Take 1-2 tablets by mouth Every 4 (Four) Hours As Needed for Moderate Pain ., Disp: 60 tablet, Rfl: 0  •  pravastatin (PRAVACHOL) 40 MG tablet, TAKE 1 TABLET DAILY, Disp: 90 tablet, Rfl: 3  •  prednisoLONE acetate (PRED FORTE) 1 % ophthalmic suspension, Administer 1 drop into the left eye daily., Disp: , Rfl:   •  Probiotic Product (PROBIOTIC PO), Take  by mouth 2 (Two) Times a Day., Disp: , Rfl:   •  Psyllium (METAMUCIL PO), Take 4 capsules by mouth daily., Disp: , Rfl:   •  ranitidine (ZANTAC) 75 MG tablet,  Take 75 mg by mouth every night., Disp: , Rfl:   •  tiZANidine (ZANAFLEX) 4 MG tablet, , Disp: , Rfl:     Allergies:    Allergies   Allergen Reactions   • Brimonidine      EYE INFLAMATION.    • Sulfa Antibiotics Other (See Comments)     Allergic to eye drops   • Tetracycline Other (See Comments)     BURNS ON THE BACK OF BOTH HANDS       Family History:  his family history includes Breast cancer (age of onset: 72) in his mother; Breast cancer (age of onset: 73) in his sister; Cancer in his other; Colon cancer (age of onset: 79) in his sister; Gallbladder disease in his father and mother; Heart attack in his mother; Heart disease in his mother and other; Hypertension in his father and mother; Ovarian cancer (age of onset: 75) in his sister; Stroke in his father.    Social History:  he  reports that he quit smoking about 42 years ago. His smoking use included Cigarettes. He has a 33.00 pack-year smoking history. He has quit using smokeless tobacco. He reports that he drinks about 1.2 oz of alcohol per week . He reports that he does not use drugs.    Pertinent Findings on   Review of Systems   Constitutional: Positive for appetite change. Negative for chills, diaphoresis, fatigue, fever and unexpected weight change.   HENT:   Positive for hearing loss. Negative for lump/mass, mouth sores, nosebleeds, sore throat, tinnitus, trouble swallowing and voice change.    Eyes: Positive for eye problems.   Respiratory: Negative for chest tightness, cough, hemoptysis, shortness of breath and wheezing.    Cardiovascular: Negative for chest pain, leg swelling and palpitations.   Gastrointestinal: Negative for abdominal distention, abdominal pain, blood in stool, constipation, diarrhea, nausea, rectal pain and vomiting.   Endocrine: Negative for hot flashes.   Genitourinary: Negative for bladder incontinence, difficulty urinating, dysuria, frequency, hematuria, nocturia and pelvic pain.    Musculoskeletal: Positive for gait problem.  Negative for arthralgias, back pain, flank pain, myalgias, neck pain and neck stiffness.   Skin: Negative for itching and rash.   Neurological: Positive for gait problem. Negative for dizziness, extremity weakness, headaches, light-headedness, numbness, seizures and speech difficulty.   Hematological: Negative for adenopathy. Does not bruise/bleed easily.   Psychiatric/Behavioral: Negative for confusion, decreased concentration, depression, sleep disturbance and suicidal ideas. The patient is not nervous/anxious.    :  Vitals:    04/16/18 0845   BP: 145/88   Pulse: 80   Resp: 16   Temp: 97 °F (36.1 °C)   TempSrc: Oral   SpO2: 95%   Weight: 85.3 kg (188 lb)   PainSc: 1  Comment: rt chest wall   PainLoc: Chest       Performance Status: (2) Ambulatory and capable of self care, unable to carry out work activity, up and about > 50% or waking hours    Pertinent Findings on:  Physical Exam   Constitutional: He is oriented to person, place, and time. He appears well-developed and well-nourished.   HENT:   Head: Normocephalic and atraumatic.   Eyes: EOM are normal.   Neck: Normal range of motion.   Pulmonary/Chest: Effort normal.   Abdominal: Soft.   Musculoskeletal:   Pain radiating from mid to low right posterior chest, along rib line anteriorly. Pain restricts raising of right arm.   Neurological: He is alert and oriented to person, place, and time.   Skin: Skin is warm and dry.   Psychiatric: He has a normal mood and affect. His behavior is normal. Judgment and thought content normal.   Vitals reviewed.      Assessment:   1. Rib pain on right side    2. Opacity of lung on imaging study    3. Adenocarcinoma of right lung     This assessment comes from my review of the imaging, pathology, physician notes and other pertinent information as mentioned.    Plan:   We reviewed today the progression of the diagnostic imaging studies, the role of the systemic treatment and also the role of the radiation therapy in palliating  symptomatic lesions or those with impending consequences. Certainly the right sided lesion is causing significant pain and warrants treatment. I also recommended increasing his pain meds significantly; however, he is very concerned with the constipation and fatigue associated so we agreed to increase to percocet 7.5 mg and adjust further as needed thereafter.      We discussed a course of radiation therapy aimed at the right posterior chest wall mass to a total dose of 3000 cGy in 15 treatments over 3 weeks. We reviewed the logistics and goals of the course of treatment. We then discussed acute side effects which are expected be minimal but might include slight erythema of the skin and  possible fatigue. We also discussed the long-term effect of the radiation therapy on the bone and I believe all questions were answered to their satisfaction.      He is very concerned with the left sided lesion as well. He will see Dr. Lowery on Friday of this week to discuss further and we can certainly treat the left sided lesion as well. I assured him we will coordinate a plan for that area of disease but that palliating the right sided lesion was the first priority and he was agreeable.    We were able to take our treatment planning films today and I anticipate we will be getting the treatments underway later this week.    I spent greater than 45 minutes in face-to-face time with the patient and 30 minutes of that time was spent in counseling and coordination of care, including review of imaging and pathology; prognosis and differential diagnosis; indications, goals, logistics, benefits and risks of treatment as well as alternatives and surveillance options.

## 2018-04-17 NOTE — TELEPHONE ENCOUNTER
Patient calls to say since receiving new pain medication yesterday he is experiencing nausea when he takes the medication. He has tried taking with crackers but states it is not helping the nausea.

## 2018-04-19 NOTE — TELEPHONE ENCOUNTER
Returning Mr Kurt call after discussing his pain with DR Flaherty.  Mr Nickie Hicks states he took the Percocet for 5 doses

## 2018-04-19 NOTE — TELEPHONE ENCOUNTER
Mr. Hicks calls to say he has gone off Percocet and went to Hydrocodone only then to Hydrocodone 1/2 pill recently . He voices concern that he has overdosed because he has been taking narcotics since February. He describes increased anxiety and feeling of burning up but denies elevated temperature. He asks if there is an alternative to the meds he is currently taking because he feels he has been on these too long and he is not getting relief.

## 2018-04-20 PROBLEM — G89.3 CANCER ASSOCIATED PAIN: Status: ACTIVE | Noted: 2018-01-01

## 2018-04-20 NOTE — PROGRESS NOTES
REASON FOR FOLLOWUP:     1.  Recurrent/metastatic lung cancer involving right chest wall, causing severe pain and likely pathological rib fracture, CT-guided biopsy confirmed on 4/5/2018 as adenocarcinoma lung primary.   2.  PET scan examination on 3/12/2018 reported hypermetabolic lesion involving the right chest wall, and also a separate left lower lobe hypermetabolic pulmonary mass.    3.  Patient had history of right lung cancer post-lobectomy in 2006 and with recurrent disease post pneumonectomy in 2008.  No adjuvant chemotherapy at that time.    4.  Patient was started on palliative radiation to the right chest wall mass/possible rib fracture on 4/20/2018.                                RECORDS OBTAINED:  Records of the patients history including those obtained from the referring provider, EMR from [facility] and the [facility], and a personal communication with thoracic surgeon Dr. Woods were reviewed and summarized in detail.    HISTORY OF PRESENT ILLNESS:  The patient is an 82 y.o. year old male who is here for evaluation of newly diagnosed and biopsy confirmed adenocarcinoma of the lung involving the right chest wall.  Imaging studies also reported left lower lobe pulmonary mass.  Patient is accompanied by his wife and daughter.  His wife helped with history.     Patient reports he started having posterior right chest wall pain in November 2017.  It was gradually getting worse over time.  Patient went to see his primary care physician on 11/29/2017, Dr. Leong requested chest x-ray examination done on the same day.  The study reported post right pneumonectomy changes, there was no evidence of right side rib fractures.  This study also showed a left parahilar region nodular density 2.4 cm.  Patient subsequently had a CT scan for the chest with IV contrast obtained at the [facility] on 12/8/2017.  This study showed 2.6×2.4 cm ill-defined nodular opacity in the  superior segment of the left lower lobe.  The right chest showed postpneumonectomy changes.  There was no mediastinal lymphadenopathy or hilar/axillary lymphadenopathy.    This patient had repeated chest, abdomen and pelvis CT scan with IV contrast on 1/17/2018 at [facility].  This study reported enlarged left upper lobe mass 3.1 cm.  There was no mediastinal or hilar lymphadenopathy.  There was post-op changes of right pneumonectomy.  There were some pleural thickening and fluid in the right hemithorax and a shift of the heart and mediastinum structure to the right side.     Patient subsequently was evaluated by his surgeon Dr. Woods at [facility].  Dr. Woods did bronchoscope examination on 2/6/2018.  Biopsy of the left lower lobe and BAL were benign.       Patient subsequently had PET scan examination on 3/12/2018 at [facility].  This study reported a hypermetabolic left upper lobe lesion measuring 4 cm with SUV 4.0.  There was also intensely increased activity at the posterior right low chest abutting the chest wall and corresponding to a soft tissue density on CT scan, between the right posterior seventh and eighth ribs.  SUV was 10.3 in this area.  There is no suspicious metabolic lesions in other places specifically in the mediastinal or hilar or abdomen.      Patient subsequently had a CT-guided right chest wall biopsy on 4/6/2018 at [facility].  Pathology evaluation reported adenocarcinoma.       Patient reports he was started palliative radiation therapy today.  I reviewed Dr. Flaherty's clinic note.  Patient will receive total 15 fractions of radiation therapy.     Patient reports his pain is significant, 9/10 at worst time.  He has been taking Norco 1 tablet every 4 hours (5/325 mg), and the pain is controlled afterwards at 3/10.  His wife reports patient was not able to tolerate Percocet which makes him feeling ill.  He is more tolerable to Norco which does cause some  sleepiness but not feeling ill.  Patient reports no hemoptysis.  His performance status is ECOG 3.  Since he has this right-sided chest pain, his mobility has significantly decreased and he basically has sedentary lifestyle.  Patient reports he has been taking MiraLAX daily, and had diarrhea in the past 2 days so he stopped the MiraLAX.      Patient reports no weight loss.  He has no nausea vomiting.    .  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   • Balance problem     DUE TO VISION TROUBLE; USES A CANE   • COPD (chronic obstructive pulmonary disease)    • DDD (degenerative disc disease), cervical     SLIGHT NEUROPATHY RIGHT HAND   • Diabetes mellitus     DIET CONTROLLED   • Elevated prostate specific antigen (PSA)    • GERD (gastroesophageal reflux disease)    • Glaucoma    • H/O: lung cancer 2006   • Hiatal hernia    • Hx of renal calculi    • Hx: recurrent pneumonia    • Hyperlipemia    • Hypertension    • Non-small cell lung cancer     Stage IB   • Partial small bowel obstruction    • Prostate cancer    • Sleep apnea     USES CPAP   • Spinal stenosis    • Visual impairment     BLIND LEFT EYE; 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     Past Surgical History:   Procedure Laterality Date   • APPENDECTOMY     • COLON RESECTION Right 9/8/2016    Procedure: SMALL BOWEL RESECTION LAPAROSCOPIC ;  Surgeon: Josef Veloz MD;  Location: [facility];  Service:    • COLONOSCOPY  10/25/2011   • CYSTOSCOPY BLADDER STONE LITHOTRIPSY  2007    2 lithotripsies   • ESOPHAGOSCOPY / EGD  11/13/2013   • EYE SURGERY      CATARACT   • HERNIA REPAIR     • LUNG SURGERY  09/25/2008    pneumonectomy   • NASAL SEPTUM SURGERY     • OTHER SURGICAL HISTORY      NASAL SEPTAL DEVIATION REPAIR   • PROSTATE SURGERY  2004    radical prostatectomy   • THORACOTOMY  2006    removed right lower lobe of lung    • TONSILLECTOMY     • TRABECULECTOMY Left 12/2014    revision left eye   • VITRECTOMY Left 12/2014    H/O Bacterial infection in left eye   • WRIST SURGERY         HEMATOLOGIC/ONCOLOGIC HISTORY:     1.  Prostate cancer 2004 status post a radical prostatectomy, PSA was negative in January 2016, patient follows by Dr. Saucedo.      2. Non-small cell lung cancer. Initially had a right lobe lobectomy in 2006 for stage IB disease. Subsequently had second primary lung cancer status post right pneumonectomy in September 2008, T2aN0 stage IB disease, mucinous adenocarcinoma.  No adjuvant chemotherapy. Had close margin <0.5 mm, but unable to receive radiation therapy due to a persistent fistula at that time. Patient follows up with Dr. Woods regularly.  No evidence of disease recurrence with most recent CT scan 08/18/2016.      3. Surgical resection of small bowel carcinoid by Dr. Roselia MD at the [facility] in September 2016.     MEDICATIONS    Current Outpatient Prescriptions:   •  albuterol (PROAIR HFA) 108 (90 BASE) MCG/ACT inhaler, Inhale 2 puffs every 4 (four) hours as needed for wheezing., Disp: , Rfl:   •  ALPRAZolam (XANAX) 0.5 MG tablet, Take 1 tablet by mouth 3 (Three) Times a Day As Needed for Anxiety., Disp: 30 tablet, Rfl: 0  •  aspirin 81 MG tablet, Take 81 mg by mouth daily. PT TO STOP PER MD INSTRUCTION, Disp: , Rfl:   •  atropine 1 % ophthalmic solution, Administer 1 drop into the left eye daily., Disp: , Rfl:   •  cholecalciferol (VITAMIN D3) 1000 UNITS tablet, Take 1,000 Units by mouth daily., Disp: , Rfl:   •  Docusate Calcium (STOOL SOFTENER PO), Take 100 mg by mouth Every Night., Disp: , Rfl:   •  hydrochlorothiazide (HYDRODIURIL) 12.5 MG tablet, Take 1 tablet by mouth Daily., Disp: 90 tablet, Rfl: 1  •  HYDROcodone-acetaminophen (NORCO) 5-325 MG per tablet, Take 1 tablet by mouth Every 4 (Four) Hours As Needed for Moderate Pain  or Severe Pain ., Disp: 180 tablet, Rfl: 0  •   losartan (COZAAR) 100 MG tablet, TAKE 1 TABLET DAILY, Disp: 90 tablet, Rfl: 3  •  ondansetron (ZOFRAN) 8 MG tablet, Take 1 tablet by mouth Every 8 (Eight) Hours As Needed for Nausea or Vomiting., Disp: 20 tablet, Rfl: 2  •  pravastatin (PRAVACHOL) 40 MG tablet, TAKE 1 TABLET DAILY, Disp: 90 tablet, Rfl: 3  •  prednisoLONE acetate (PRED FORTE) 1 % ophthalmic suspension, Administer 1 drop into the left eye daily., Disp: , Rfl:   •  Probiotic Product (PROBIOTIC PO), Take  by mouth 2 (Two) Times a Day., Disp: , Rfl:   •  Psyllium (METAMUCIL PO), Take 4 capsules by mouth daily., Disp: , Rfl:   •  tiZANidine (ZANAFLEX) 4 MG tablet, , Disp: , Rfl:     ALLERGIES:     Allergies   Allergen Reactions   • Brimonidine      EYE INFLAMATION.    • Sulfa Antibiotics Other (See Comments)     Allergic to eye drops   • Tetracycline Other (See Comments)     BURNS ON THE BACK OF BOTH HANDS       SOCIAL HISTORY:       Social History     Social History   • Marital status:      Spouse name: Lianna   • Number of children: 5   • Years of education: Post Grad     Occupational History   • [employer]  Retired     Social History Main Topics   • Smoking status: Former Smoker     Packs/day: 1.50     Years: 22.00     Types: Cigarettes     Quit date: 9/2/1975   • Smokeless tobacco: Former User   • Alcohol use 1.2 oz/week     2 Shots of liquor per week   • Drug use: No   • Sexual activity: Defer       FAMILY HISTORY:  Family History   Problem Relation Age of Onset   • Cancer Other    • Heart disease Other    • Breast cancer Mother 72   • Heart disease Mother    • Hypertension Mother    • Gallbladder disease Mother    • Heart attack Mother    • Hypertension Father    • Gallbladder disease Father    • Stroke Father    • Breast cancer Sister 73   • Ovarian cancer Sister 75   • Colon cancer Sister 79       REVIEW OF SYSTEMS:  Review of Systems   Constitutional: Positive for activity change, fatigue and unexpected weight change.  "Negative for appetite change, chills and fever.   HENT: Negative for congestion, facial swelling, hearing loss, nosebleeds, postnasal drip, rhinorrhea, sinus pressure, tinnitus and voice change.    Eyes: Negative for visual disturbance.   Respiratory: Negative for cough, chest tightness, shortness of breath, wheezing and stridor.    Cardiovascular: Positive for chest pain. Negative for palpitations and leg swelling.   Gastrointestinal: Negative for abdominal pain, blood in stool, nausea and vomiting.   Endocrine: Negative for heat intolerance.   Genitourinary: Positive for flank pain and frequency. Negative for dysuria and hematuria.   Musculoskeletal: Positive for back pain. Negative for arthralgias, gait problem and neck pain.   Skin: Negative for pallor and wound.   Neurological: Positive for facial asymmetry. Negative for dizziness, seizures, syncope, weakness, light-headedness and headaches.   Hematological: Negative for adenopathy. Does not bruise/bleed easily.   Psychiatric/Behavioral: Negative for agitation and confusion.              Vitals:    04/20/18 1609   BP: 138/81   Pulse: 69   Resp: 16   Temp: 97.6 °F (36.4 °C)   TempSrc: Oral   SpO2: 94%   Weight: 84.1 kg (185 lb 6.4 oz)   Height: 174 cm (68.5\")  Comment: new ht   PainSc:   2   PainLoc: Rib Cage     Current Status 4/20/2018   ECOG score 3       PHYSICAL EXAM:      CONSTITUTIONAL:  Well-developed well-nourished  male fitting with his age.  No distress, looks comfortable.  EYES:  Conjunctiva and lids unremarkable.  PERRLA  EARS,NOSE,MOUTH,THROAT:  Ears and nose appear unremarkable.  Lips, gums appear unremarkable.  RESPIRATORY:  Diminished breathing sounds on the right side.  Left lung clear.    CARDIOVASCULAR: Regular rhythm and rate.  Normal S1, S2.  No murmurs rubs or gallops.    GASTROINTESTINAL: Abdomen appears unremarkable.  Nontender.  No hepatomegaly.  No splenomegaly.  LYMPHATIC:  No cervical, supraclavicular, axillary " lymphadenopathy.  MUSCULOSKELETAL:  Unremarkable gait.  No cyanosis or clubbing. No significant lower extremity edema.    SKIN:  Warm.  No rashes.  PSYCHIATRIC:  Normal judgment and insight.  Normal mood and affect.      RECENT LABS:  Lab Results   Component Value Date    WBC 8.74 04/20/2018    HGB 14.4 04/20/2018    HCT 41.6 04/20/2018    MCV 94.3 04/20/2018     04/20/2018     Lab Results   Component Value Date    NEUTROABS 5.93 04/20/2018     Lab Results   Component Value Date    GLUCOSE 173 (H) 09/10/2016    BUN 15 12/21/2017    CREATININE 0.80 01/17/2018    EGFRIFNONA 101 12/21/2017    EGFRIFAFRI 123 12/21/2017    BCR 20.3 12/21/2017    K 4.6 12/21/2017    CO2 29.8 (H) 12/21/2017    CALCIUM 8.5 (L) 12/21/2017    PROTENTOTREF 5.8 (L) 12/21/2017    ALBUMIN 3.50 12/21/2017    LABIL2 1.5 12/21/2017    AST 11 12/21/2017    ALT 13 12/21/2017       IMAGE STUDY:   CT OF THE CHEST AND ABDOMEN WITH CONTRAST 01/17/2018.     HISTORY: Right upper quadrant pain. Right rib pain. History of previous  right pneumonectomy.     TECHNIQUE: Axial images were obtained from the lung apices to the  symphysis pubis after intravenous and oral contrast.     FINDINGS: There is evidence of previous right pneumonectomy with some  pleural thickening and fluid in the right hemithorax and shift of the  heart and mediastinal structures to the right. Small amount of fluid is  seen in pericardial recesses. No pathologically enlarged hilar or  mediastinal lymph nodes are seen.     In the superior segment of the left lower lobe on image 41 there is an  ill-defined masslike opacity measuring approximately 3.1 cm in size.  This has increased in size from the previous study of 12/08/2017 and may  contain minimal air bronchogram but is suspicious for neoplasm. No other  lung masses are seen.     Small left hepatic lobe cyst is seen on image 19. The liver,  gallbladder, spleen and pancreas are otherwise unremarkable.     The adrenal glands are not  enlarged. There are bilateral renal cysts.     There is colonic diverticulosis. There are L5 pars defects with minimal  anterolisthesis of L5 on S1.     IMPRESSION:  1. The ill-defined mass in the superior segment left lower lobe has  increased in size from 12/08/2017 and is suspicious for neoplasm.  Further evaluation recommended.  2. Status post right pneumonectomy.  3. Small left hepatic lobe cyst.  4. Colonic diverticulosis.      Assessment/Plan      1.  Recurrent/metastatic lung cancer with soft tissue mass involving the right posterior chest wall between the seventh and eighth ribs.  This was biopsy confirmed to be adenocarcinoma.  PET scan not only showed a hypermetabolic lesion at this location, but also a left lower lobe pulmonary mass with SUV 4.0.  He did have a bronchoscope examination by Dr. Woods for this LLL pulmonary lesion, however biopsy was not diagnostic at that time.    Patient is an 82 years old male or has history of right lower lobe lung cancer status post RLL lobectomy in 2006, subsequently developed secondary primary lung cancer and required right pneumonectomy in 2008.  There was no adjuvant chemotherapy due to early stage disease, however the second surgery in 2008 had a very close margin less than 0.5 mm.  However due to delayed wound healing with a fistula, radiation therapy was not given.      I reviewed his CT scan images from 1/17/2018, the left lower lobe pulmonary mass is more of central location.  I discussed with the patient and the his family members, a CT-guided needle biopsy of this lung mass is not once it due to high risk for pneumothorax and this patient already had right pneumonectomy, so if he does develop pneumothorax, it could be very dangerous and even life-threatening.  So most likely we will never know the histology of this left lower lobe mass, only to assume its malignant status because of elevated metabolic activity.      I discussed with the patient, his wife  and his daughter today, because the tumor is 4 cm by PET scan, stereotactic radiation therapy may not be feasible.  So most likely he will get conventional radiation therapy which likely will involving large lung field and carries risk for radiation pneumonitis.      We are waiting for further pathology studies from the biopsy sample from the right chest wall, including EGFR mutation, ALK translocation, ROS 1 translocation and PD-L1 expression.  I explained to patient and his wife and his daughter, depends on the further pathology of admission, he may be a candidate for especially designed small mononuclear tyrosine kinase inhibitors, or candidate for checkpoint immunotherapy with Keytruda. I explained to them the meaning of genetic mutations/gene translocations as well as the inhibitory effect from tumor cells through the PD-1/PD-L1 pathway.   I also explained to them the corresponding side effects associated with those different treatment modalities.  They voiced understanding.      2.  Pain secondary to cancer involving the right sided posterior chest wall.  According to the procedure note of CT-guided needle biopsy on 4/5/2018, radiologist commented there may be fracture on the rib.  Patient is starting radiation therapy for pain control.      Patient also takes Norco 5/325 mg every 4 hours for pain control with good results.  He is running out of medicine.  We'll take over the management of his pain from this point.  I issued him a new prescription with 180 tablets without refills.  Hopefully with radiation therapy, his pain will improve and he can reduce to quantity of Norco he uses.  He was not able to tolerate Percocet.      Because there is only one area with possible bone metastases, and he is getting palliative radiation therapy in the area.  I do not think giving this patient Zometa will be in the best interest of him.    We also discussed possible constipation related to narcotics.  He was using MiraLAX  and had diarrhea for the past 2 days.  I advised patient to use Senokot up to 4 tablets daily through the morning and 2 in the night to help with bowel movement and avoid causing diarrhea.  Patient and family members voiced understanding.        PLAN:   1.  Waiting further pathology study results for EGFR mutation, ALK and ROS1 translocation, and expression of PD-L1.   2.  Continue palliative radiation therapy to the right posterior chest wall mass.    3.  I issued him a new prescription with 180 tablets of the 5/325-mg tablets without refills.    4.  Case will be presented at the multi moderately lung conference.    5.  We'll bring patient back in 3 weeks for reevaluation, and to discuss further management options.        ISABEL TAMEZ M.D., Ph.D.    4/20/2018        CC:   Marcos Woods M.D. [facility]     Dominga Flaherty M.D.    Alexis Denny M.D.      Dictated using Dragon Dictation.

## 2018-04-24 NOTE — TELEPHONE ENCOUNTER
Mr Hicks calls to say he is having issues with constipation. He has stopped his miralx and been taking Senokot s as directed by [facility]. We reviewed the laxative protocol sheet since he has already completed the first three steps.   We discussed adding MOM and he asked if he could use an enema. He states he has used in the past and they helped. He will use enema and continue senokot s.   He will see DR Flaherty Thursday and call if any issues.

## 2018-04-26 NOTE — PROGRESS NOTES
Physician Weekly Management Note    Diagnosis:     1. Adenocarcinoma of right lung      Reason for Visit:   Tx: 5/15    Concurrent Chemo:   No    Notes on Treatment course, Films, Medical progress and Plan:  Events with pain, percocet and constipation/diarrhea reviewed.  Recommended he go back to the Miralax but also discussed with decreasing pain meds, he may lean toward diarrhea and remedies regarding dosing. He like the hydrocodone and is still taking every 4 hours, 1 pill. I suggested taking 1 prior to treatment as well. No problems or questions with the RT, cont on.    ROS - Other than as listed above, as follows:  Constitutional - Normal - no complaints of fatigue, denies lack of appetite, fever, night sweats or change in weight.  Neck - Normal - denies neck masses, muscle weakness, neck pain, decreased range of motion or swelling.  Cardiovascular - Normal - denies arrhythmias, chest pain, dyspnea, edema, orthopnea or palpitations.  Respiratory - Normal - denies cough, dyspnea, hemoptysis, hiccoughs, pleuritic chest pain or wheezing.  Gastrointestinal - Normal - no complaints of constipation, abdominal pain, diarrhea, heartburn/dyspepsia, hematemesis, hemorrhoids, melena or GI bleeding, nausea, pain or cramping or vomiting.    PHYSICAL EXAM - Other than as listed above, as follows:  There were no vitals filed for this visit.    Constitutional - Normal - no evidence of impaired alertness, inadequate appearance, premature or advanced chronologic age, uncooperativeness, altered mood, affect or disorientation.  Neck - Normal - no evidence of tender or enlarged lymph nodes, neck abnormalities, restricted range of motion or enlarged thyroid.  Chest - Normal - no evidence of chest abnormalities, tender or enlarged lymph nodes.  Respiratory - Normal - no evidence of abnormal breat sounds, chest abnormalities on palpation and chest abnormalities on percussion and normal breath sounds.  Hematologic/Lymphatic - Normal  "- no evidence of tender or enlarged axillary lymph nodes nor tender or enlarged neck nodes.    Performance Status:  (2) Ambulatory and capable of self care, unable to carry out work activity, up and about > 50% or waking hours    Problem added:  No problems updated.  Medications added: No orders of the defined types were placed in this encounter.    Ancillary referrals made: No orders of the defined types were placed in this encounter.      Technical aspects reviewed:  Weekly OBI approved if applicable? Yes  Weekly port films approved?   Yes  Change requests noted if applicable?  No  Patient setup and plan reviewed?  Yes    Chart Reviewed:  Continue current treatment plan?   Yes  Treatment plan change requested?  No    I have reviewed and marked as \"reviewed\" the current medications, allergies and problem list in the patients EMR.  I have reviewed the patient's medical, surgical  history in detail, reviewed any pertinent lab work and updated the computerized patient record if needed.    Patient's Care Team:  Patient Care Team:  Alexis Denny MD as PCP - General (Internal Medicine)  Alexis Denny MD as PCP - Family Medicine  Alexis Denny MD as PCP - [facility]  Luis Lowery MD PhD as Consulting Physician (Hematology and Oncology)  Josef Veloz MD as Referring Physician (General Surgery)  Chanelle Flaherty MD as Consulting Physician (Radiation Oncology)    Seen and approved by:  Chanelle Flaherty MD, 04/26/2018    "

## 2018-04-30 NOTE — PROGRESS NOTES
OSW met with the pt and his supportive wife following his XRT tx at [place] today. The pt is being treated for recurrent lung cancer of the chest wall. The pt walks with a straight cane for support/stabilization.    The pt presented as alert, pleasant and open. We discussed his health challenges, particularly the pain he has been experiencing with his ribs, connected to his chest wall recurrence.    The couple states they are coping well, overall. The pt is a retired [employer] . They have resided in [place] for the past 17 years (relocated from [place]) and have one of their 5 children in [place] (a daughter) as support. The other children live out of state ([place] and [place]). They have 4 grandchildren.      The couple reported that they made arrangements through the [employer] to have their cremations covered through a package of services, in order to not be a burden to their children. It is planned that the cremains will be transported to [place]. The pt agreed to bring in his living will for scanning into his medical record.    The couple was offered support information and declined it at this time stating they did not feel needful. OSW supplied her contact information and remains available as needs arise.    Dinorah Doherty, [facility]  Oncology Social Worker

## 2018-05-02 NOTE — PROGRESS NOTES
"  Physician Weekly Management Note    Diagnosis:     1. Adenocarcinoma of right lung    2. Pain of lumbar spine      Reason for Visit:   Tx: 9/15    Concurrent Chemo:   No    Notes on Treatment course, Films, Medical progress and Plan:  Events with pain, percocet and constipation/diarrhea reviewed again. Bowels improved with Miralax and intermittent MOM. Pain pills continue,1 q 4 hours, extra pill prior to treatment. Walked around the block twice. Reviewed timing of improvement again.  No problems or questions with the RT, cont on.    ROS - Other than as listed above, as follows:  Constitutional - Normal - no complaints of fatigue, denies lack of appetite, fever, night sweats or change in weight.  Neck - Normal - denies neck masses, muscle weakness, neck pain, decreased range of motion or swelling.  Cardiovascular - Normal - denies arrhythmias, chest pain, dyspnea, edema, orthopnea or palpitations.  Respiratory - Normal - denies cough, dyspnea, hemoptysis, hiccoughs, pleuritic chest pain or wheezing.  Gastrointestinal - Normal - no complaints of constipation, abdominal pain, diarrhea, heartburn/dyspepsia, hematemesis, hemorrhoids, melena or GI bleeding, nausea, pain or cramping or vomiting.    PHYSICAL EXAM - Other than as listed above, as follows:  Vitals:    05/02/18 1110   BP: 128/80   Pulse: 92   Temp: 97.2 °F (36.2 °C)   SpO2: 96%   Weight: 83.9 kg (185 lb)   Height: 174 cm (68.5\")   PainSc:   2       Constitutional - Normal - no evidence of impaired alertness, inadequate appearance, premature or advanced chronologic age, uncooperativeness, altered mood, affect or disorientation.  Neck - Normal - no evidence of tender or enlarged lymph nodes, neck abnormalities, restricted range of motion or enlarged thyroid.  Chest - Normal - no evidence of chest abnormalities, tender or enlarged lymph nodes.  Respiratory - Normal - no evidence of abnormal breat sounds, chest abnormalities on palpation and chest abnormalities " "on percussion and normal breath sounds.  Hematologic/Lymphatic - Normal - no evidence of tender or enlarged axillary lymph nodes nor tender or enlarged neck nodes.    Performance Status:  (2) Ambulatory and capable of self care, unable to carry out work activity, up and about > 50% or waking hours    Problem added:  No problems updated.  Medications added: No orders of the defined types were placed in this encounter.    Ancillary referrals made: No orders of the defined types were placed in this encounter.      Technical aspects reviewed:  Weekly OBI approved if applicable? Yes  Weekly port films approved?   Yes  Change requests noted if applicable?  No  Patient setup and plan reviewed?  Yes    Chart Reviewed:  Continue current treatment plan?   Yes  Treatment plan change requested?  No    I have reviewed and marked as \"reviewed\" the current medications, allergies and problem list in the patients EMR.  I have reviewed the patient's medical, surgical  history in detail, reviewed any pertinent lab work and updated the computerized patient record if needed.    Patient's Care Team:  Patient Care Team:  Alexis Denny MD as PCP - General (Internal Medicine)  Alexis Denny MD as PCP - Family Medicine  Alexis Denny MD as PCP - [facility]  Luis Lowery MD PhD as Consulting Physician (Hematology and Oncology)  Josef Veloz MD as Referring Physician (General Surgery)  Chanelle Flaherty MD as Consulting Physician (Radiation Oncology)    Seen and approved by:  Chanelle Flaherty MD, 04/26/2018    "

## 2018-05-02 NOTE — PROGRESS NOTES
Pt delivered his living will to this OSW today. OSW faxed the document to HIM for scanning. The pt's living will is now entered into the pt's medical record.    Dinorah Doherty, [facility]  Oncology Social Worker

## 2018-05-08 NOTE — PROGRESS NOTES
"  Physician Weekly Management Note    Diagnosis:     1. Adenocarcinoma of right lung    2. Pain of lumbar spine      Reason for Visit:   Tx: 13/15    Concurrent Chemo:   No    Notes on Treatment course, Films, Medical progress and Plan:  Pain slightly improved. Sleeping better. Still using meds every 4 hours. Constipation controlled. Likes the xanax in the evening and requests refill which I will do today.    Discussed treatment of lung and our discussion at [facility]. He sees Dr. Lowery next week and will discuss with him. Assured him we could treat that area if requested.  Reviewed timing of improvement again.  No problems or questions with the RT, cont on.    ROS - Other than as listed above, as follows:  Constitutional - Normal - no complaints of fatigue, denies lack of appetite, fever, night sweats or change in weight.  Neck - Normal - denies neck masses, muscle weakness, neck pain, decreased range of motion or swelling.  Cardiovascular - Normal - denies arrhythmias, chest pain, dyspnea, edema, orthopnea or palpitations.  Respiratory - Normal - denies cough, dyspnea, hemoptysis, hiccoughs, pleuritic chest pain or wheezing.  Gastrointestinal - Normal - no complaints of constipation, abdominal pain, diarrhea, heartburn/dyspepsia, hematemesis, hemorrhoids, melena or GI bleeding, nausea, pain or cramping or vomiting.    PHYSICAL EXAM - Other than as listed above, as follows:  Vitals:    05/08/18 1119   BP: 120/78   Pulse: 73   Temp: 97.4 °F (36.3 °C)   TempSrc: Oral   SpO2: 94%   Weight: 84.1 kg (185 lb 6.5 oz)   Height: 174 cm (68.5\")   PainSc: 2  Comment: hurts more with exaggerated movement   PainLoc: Chest       Constitutional - Normal - no evidence of impaired alertness, inadequate appearance, premature or advanced chronologic age, uncooperativeness, altered mood, affect or disorientation.  Neck - Normal - no evidence of tender or enlarged lymph nodes, neck abnormalities, restricted range of motion or " "enlarged thyroid.  Chest - Normal - no evidence of chest abnormalities, tender or enlarged lymph nodes.  Respiratory - Normal - no evidence of abnormal breat sounds, chest abnormalities on palpation and chest abnormalities on percussion and normal breath sounds.  Hematologic/Lymphatic - Normal - no evidence of tender or enlarged axillary lymph nodes nor tender or enlarged neck nodes.    Performance Status:  (2) Ambulatory and capable of self care, unable to carry out work activity, up and about > 50% or waking hours    Problem added:  No problems updated.  Medications added: No orders of the defined types were placed in this encounter.    Ancillary referrals made: No orders of the defined types were placed in this encounter.      Technical aspects reviewed:  Weekly OBI approved if applicable? Yes  Weekly port films approved?   Yes  Change requests noted if applicable?  No  Patient setup and plan reviewed?  Yes    Chart Reviewed:  Continue current treatment plan?   Yes  Treatment plan change requested?  No    I have reviewed and marked as \"reviewed\" the current medications, allergies and problem list in the patients EMR.  I have reviewed the patient's medical, surgical  history in detail, reviewed any pertinent lab work and updated the computerized patient record if needed.    Patient's Care Team:  Patient Care Team:  Alexis Denny MD as PCP - General (Internal Medicine)  Alexis Denny MD as PCP - Family Medicine  Alexis Denny MD as PCP - [facility]  Luis Lowery MD PhD as Consulting Physician (Hematology and Oncology)  Josef Veloz MD as Referring Physician (General Surgery)  Chanelle Flaherty MD as Consulting Physician (Radiation Oncology)    Seen and approved by:  Chanelle Flaherty MD, 04/26/2018    "

## 2018-05-09 NOTE — PROGRESS NOTES
OSW met with the pt and his wife today as they had an additional question. The pt was seeking information about [facility] and Palliative Care. OSW offered a verbal explanation along with print material form [facility].  OSW suggested that the pt consider requesting an EOS (Explanation of Services) visit from a [facility] representative.    OSW remains available as needs arise.    Dinorah Doherty, [facility]  Oncology Social Worker

## 2018-05-11 NOTE — PROGRESS NOTES
RADIATION THERAPY TREATMENT SUMMARY    Diagnosis: Adenocarcinoma of right lung    Patient Care Team:  Alexis Denny MD as PCP - General (Internal Medicine)  Alexis Denny MD as PCP - Family Medicine  Alexis Denny MD as PCP - [facility]  Luis Lowery MD PhD as Consulting Physician (Hematology and Oncology)  Josef Veloz MD as Referring Physician (General Surgery)  Chanelle Flaherty MD as Consulting Physician (Radiation Oncology)    Carlito Hicks has recently completed the course of radiation therapy prescribed for the above-mentioned diagnosis.  Below, please find the specifics of the course of treatment delivered:    Radiation Details:      Dates of treatment:  4/20/2018 - 5/10/2018    Details of radiation therapy:  Treatment Site - RIGHT POST CHEST WALL  Treatment Intent - Palliative  Total Dose in cGy - 3000  Number of Treatments - 15  Dose per fraction - 200 cGy per fraction  Fractions per day - 1 fx/day  Fractions per week - 5 fx/week  Treatment Type - 3 fields , 3D  Energy - 6 MVP, 18 MVP  Normalization - Isocenter, Prescribed at 95%  Imaging/Field Verification - Simulation before first treatment to verify field, blocking, placement and positioning, Simulation for all ports of change, Weekly port films of all ports, Other, see below    Tolerance and Toxicities: He tolerated the treatments well, with no significant side effects, requiring no treatment breaks. He completed the treatments in 20 elapsed days.    Follow-up Plans:  I have asked the patient to return to see me in approximately 10 days and we will consider treatment for the lung at that time.

## 2018-05-15 NOTE — PROGRESS NOTES
REASON FOR FOLLOWUP:     1.  Recurrent/metastatic lung cancer involving right chest wall, causing severe pain and likely pathological rib fracture, CT-guided biopsy confirmed on 4/5/2018 as adenocarcinoma lung primary.   2.  PET scan examination on 3/12/2018 reported hypermetabolic lesion involving the right chest wall, and also a separate left lower lobe hypermetabolic pulmonary mass.    3.  Patient had history of right lung cancer post-lobectomy in 2006 and with recurrent disease post pneumonectomy in 2008.  No adjuvant chemotherapy at that time.    4.  Patient was started on palliative radiation to the right chest wall mass/possible rib fracture on 4/20/2018 and the finished on 5/10/2018.   5.  Patient's case was presented at the the [facility] lung conference on 5/2/2018.  The consensus was if patient has positive PD-L1 on tumor sample, he will be treated with first line Keytruda.  If PD-L1 was negative, he will receive radiation therapy to his left lung mass.                   HISTORY OF PRESENT ILLNESS:  The patient is an 82 y.o. year old male who is here for re-evaluation to discuss the results of pathology study from the biopsy of right lung/chest wall lesion.  Patient is accompanied by his wife and daughter.  His wife helped with history.     Patient reports that he finished the radiation therapy to the right chest wall last week.  His pain is better controlled.  He is self tapering down his oxycodone, which causes nausea.  He currently takes oxycodone 3 times a day, decreased from previous 5 times a day.  He is pain is minimum when he sits and does not move around.  He reports good appetite, no weight loss.     I presented his case on 5/2/2018 at the [facility] lung conference.  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  The study for PD-L1 was pending at that point.  After  discussion, the consensus was if patient is positive for PD-L1 expression (> 50%), then he will be given first-line treatment using Keytruda.  If his negative for PD-L1 expression, then he will have radiation therapy to the left lung mass.  Patient himself does not want to have systematic chemotherapy.     We'll receive the updated pathology report on 5/14/2018 which reported PD-L1 expression 1%.  He is not a candidate for first line Keytruda treatment.      His performance status is ECOG 2.  Patient reports no weight loss.  He has no nausea vomiting.    .  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   • Balance problem     DUE TO VISION TROUBLE; USES A CANE   • COPD (chronic obstructive pulmonary disease)    • DDD (degenerative disc disease), cervical     SLIGHT NEUROPATHY RIGHT HAND   • Diabetes mellitus     DIET CONTROLLED   • Elevated prostate specific antigen (PSA)    • GERD (gastroesophageal reflux disease)    • Glaucoma    • H/O: lung cancer 2006   • Hiatal hernia    • Hx of renal calculi    • Hx: recurrent pneumonia    • Hyperlipemia    • Hypertension    • Non-small cell lung cancer     Stage IB   • Partial small bowel obstruction    • Prostate cancer    • Sleep apnea     USES CPAP   • Spinal stenosis    • Visual impairment     BLIND LEFT EYE; 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     Past Surgical History:   Procedure Laterality Date   • APPENDECTOMY     • COLON RESECTION Right 9/8/2016    Procedure: SMALL BOWEL RESECTION LAPAROSCOPIC ;  Surgeon: Josef Veloz MD;  Location: [facility]  Service:    • COLONOSCOPY  10/25/2011   • CYSTOSCOPY BLADDER STONE LITHOTRIPSY  2007    2 lithotripsies   • ESOPHAGOSCOPY / EGD  11/13/2013   • EYE SURGERY      CATARACT   • HERNIA REPAIR     • LUNG SURGERY  09/25/2008    pneumonectomy   • NASAL SEPTUM SURGERY     • OTHER SURGICAL  HISTORY      NASAL SEPTAL DEVIATION REPAIR   • PROSTATE SURGERY  2004    radical prostatectomy   • THORACOTOMY  2006    removed right lower lobe of lung   • TONSILLECTOMY     • TRABECULECTOMY Left 12/2014    revision left eye   • VITRECTOMY Left 12/2014    H/O Bacterial infection in left eye   • WRIST SURGERY         HEMATOLOGIC/ONCOLOGIC HISTORY:     1.  Prostate cancer 2004 status post a radical prostatectomy, PSA was negative in January 2016, patient follows by Dr. Saucedo.      2. Non-small cell lung cancer. Initially had a right lobe lobectomy in 2006 for stage IB disease. Subsequently had second primary lung cancer status post right pneumonectomy in September 2008, T2aN0 stage IB disease, mucinous adenocarcinoma.  No adjuvant chemotherapy. Had close margin <0.5 mm, but unable to receive radiation therapy due to a persistent fistula at that time. Patient follows up with Dr. Woods regularly.  No evidence of disease recurrence with most recent CT scan 08/18/2016.      3. Surgical resection of small bowel carcinoid by Dr. Roselia MD at the [facility] in September 2016.     4.  Recurrent lung cancer in 2018.   The patient is an 82 y.o. year old male who is here for evaluation of newly diagnosed and biopsy confirmed adenocarcinoma of the lung involving the right chest wall.  Imaging studies also reported left lower lobe pulmonary mass.  Patient is accompanied by his wife and daughter.  His wife helped with history.      Recurrent/metastatic lung cancer involving right chest wall, causing severe pain and likely pathological rib fracture, CT-guided biopsy confirmed on 4/5/2018 as adenocarcinoma lung primary.    PET scan examination on 3/12/2018 reported hypermetabolic lesion involving the right chest wall, and also a separate left lower lobe hypermetabolic pulmonary mass.     Patient was started on palliative radiation to the right chest wall mass/possible rib fracture on 4/20/2018 and the finished on  5/10/2018.    Patient's case was presented at the the [facility] on 5/2/2018.  The consensus was if patient has positive PD-L1 on tumor sample, he will be treated with first line Keytruda.  If PD-L1 was negative, he will receive radiation therapy to his left lung mass.           MEDICATIONS    Current Outpatient Prescriptions:   •  albuterol (PROAIR HFA) 108 (90 BASE) MCG/ACT inhaler, Inhale 2 puffs every 4 (four) hours as needed for wheezing., Disp: , Rfl:   •  ALPRAZolam (XANAX) 0.5 MG tablet, Take 1 tablet by mouth 3 (Three) Times a Day As Needed for Anxiety., Disp: 30 tablet, Rfl: 0  •  aspirin 81 MG tablet, Take 81 mg by mouth daily. PT TO STOP PER MD INSTRUCTION, Disp: , Rfl:   •  atropine 1 % ophthalmic solution, Administer 1 drop into the left eye daily., Disp: , Rfl:   •  cholecalciferol (VITAMIN D3) 1000 UNITS tablet, Take 1,000 Units by mouth daily., Disp: , Rfl:   •  Docusate Calcium (STOOL SOFTENER PO), Take 100 mg by mouth Every Night., Disp: , Rfl:   •  hydrochlorothiazide (HYDRODIURIL) 12.5 MG tablet, Take 1 tablet by mouth Daily., Disp: 90 tablet, Rfl: 1  •  HYDROcodone-acetaminophen (NORCO) 5-325 MG per tablet, Take 1 tablet by mouth Every 4 (Four) Hours As Needed for Moderate Pain  or Severe Pain ., Disp: 180 tablet, Rfl: 0  •  losartan (COZAAR) 100 MG tablet, TAKE 1 TABLET DAILY, Disp: 90 tablet, Rfl: 3  •  ondansetron (ZOFRAN) 8 MG tablet, Take 1 tablet by mouth Every 8 (Eight) Hours As Needed for Nausea or Vomiting., Disp: 20 tablet, Rfl: 2  •  pravastatin (PRAVACHOL) 40 MG tablet, TAKE 1 TABLET DAILY, Disp: 90 tablet, Rfl: 3  •  prednisoLONE acetate (PRED FORTE) 1 % ophthalmic suspension, Administer 1 drop into the left eye daily., Disp: , Rfl:   •  Probiotic Product (PROBIOTIC PO), Take  by mouth 2 (Two) Times a Day., Disp: , Rfl:   •  Psyllium (METAMUCIL PO), Take 4 capsules by mouth daily., Disp: , Rfl:     ALLERGIES:     Allergies   Allergen Reactions   • Brimonidine       EYE INFLAMATION.    • Sulfa Antibiotics Other (See Comments)     Allergic to eye drops   • Tetracycline Other (See Comments)     BURNS ON THE BACK OF BOTH HANDS       SOCIAL HISTORY:       Social History     Social History   • Marital status:      Spouse name: Lianna   • Number of children: 5   • Years of education: Post Grad     Occupational History   • [employer]  Retired     Social History Main Topics   • Smoking status: Former Smoker     Packs/day: 1.50     Years: 22.00     Types: Cigarettes     Quit date: 9/2/1975   • Smokeless tobacco: Former User   • Alcohol use 1.2 oz/week     2 Shots of liquor per week   • Drug use: No   • Sexual activity: Defer       FAMILY HISTORY:  Family History   Problem Relation Age of Onset   • Cancer Other    • Heart disease Other    • Breast cancer Mother 72   • Heart disease Mother    • Hypertension Mother    • Gallbladder disease Mother    • Heart attack Mother    • Hypertension Father    • Gallbladder disease Father    • Stroke Father    • Breast cancer Sister 73   • Ovarian cancer Sister 75   • Colon cancer Sister 79       REVIEW OF SYSTEMS:  Review of Systems   Constitutional: Positive for activity change, fatigue and unexpected weight change. Negative for appetite change, chills and fever.   HENT: Negative for congestion, facial swelling, hearing loss, nosebleeds, postnasal drip, rhinorrhea, sinus pressure, tinnitus and voice change.    Eyes: Negative for visual disturbance.   Respiratory: Negative for cough, chest tightness, shortness of breath, wheezing and stridor.    Cardiovascular: Positive for chest pain (Right chest pain has much improved after radiation therapy). Negative for palpitations and leg swelling.   Gastrointestinal: Negative for abdominal pain, blood in stool, nausea and vomiting.   Endocrine: Negative for heat intolerance.   Genitourinary: Negative for dysuria, flank pain, frequency and hematuria.   Musculoskeletal: Positive for  "back pain. Negative for arthralgias, gait problem and neck pain.   Skin: Negative for pallor and wound.   Neurological: Positive for facial asymmetry. Negative for dizziness, seizures, syncope, weakness, light-headedness and headaches.   Hematological: Negative for adenopathy. Does not bruise/bleed easily.   Psychiatric/Behavioral: Negative for agitation and confusion.              Vitals:    05/15/18 0914   BP: 110/70   Pulse: 78   Resp: 16   Temp: 98.4 °F (36.9 °C)   SpO2: 95%  Comment: at rest   Weight: 82.8 kg (182 lb 9.6 oz)   Height: 174 cm (68.5\")   PainSc: 2  Comment: rt. side     Current Status 4/20/2018   ECOG score 3       PHYSICAL EXAM:      CONSTITUTIONAL:  Well-developed well-nourished  male fitting with his age.  No distress, looks comfortable.  EYES:  Conjunctiva and lids unremarkable.    EARS,NOSE,MOUTH,THROAT:  Ears and nose appear unremarkable.  Lips, gums appear unremarkable.  RESPIRATORY:  Diminished breathing sounds on the right side.  Left lung clear.    CARDIOVASCULAR: Regular rhythm and rate.  Normal S1, S2.  No murmurs rubs or gallops.    GASTROINTESTINAL: Abdomen appears unremarkable.  Nontender.  No hepatomegaly.  No splenomegaly.  LYMPHATIC:  No cervical, supraclavicular, axillary lymphadenopathy.  MUSCULOSKELETAL:  Unremarkable gait.  No cyanosis or clubbing. No significant lower extremity edema.    SKIN:  Warm.  No rashes.  PSYCHIATRIC:  Normal judgment and insight.  Normal mood and affect.      RECENT LABS:  Lab Results   Component Value Date    WBC 6.66 05/15/2018    HGB 13.8 05/15/2018    HCT 41.7 05/15/2018    MCV 96.1 05/15/2018     05/15/2018     Lab Results   Component Value Date    NEUTROABS 5.33 05/15/2018     Lab Results   Component Value Date    GLUCOSE 173 (H) 09/10/2016    BUN 15 12/21/2017    CREATININE 0.80 01/17/2018    EGFRIFNONA 101 12/21/2017    EGFRIFAFRI 123 12/21/2017    BCR 20.3 12/21/2017    K 4.6 12/21/2017    CO2 29.8 (H) 12/21/2017    CALCIUM " 8.5 (L) 12/21/2017    PROTENTOTREF 5.8 (L) 12/21/2017    ALBUMIN 3.50 12/21/2017    LABIL2 1.5 12/21/2017    AST 11 12/21/2017    ALT 13 12/21/2017          Assessment/Plan      1.  Recurrent/metastatic lung cancer with soft tissue mass involving the right posterior chest wall between the seventh and eighth ribs.  This was biopsy confirmed to be adenocarcinoma.  PET scan not only showed a hypermetabolic lesion at this location, but also a left lower lobe pulmonary mass with SUV 4.0.  He did have a bronchoscope examination by Dr. Woods for this LLL pulmonary lesion, however biopsy was not diagnostic at that time.    Patient finished the palliative radiation therapy to this right chest wall lesion, with significant improvement of pain.  He is self tapering his oxycodone because of making him nausea.  He currently takes oxycodone 3 times a day.  His pain is 2/10 in the office.  His wife reports patient does not have much pain when he sits but if he were carotid he would've complained more severe pain.  His mobility seems improved after pain is controlled.    We received updated information from the pathology evaluation, he indeed is negative for PDL 1 expression only 1%, not a candidate for immunotherapy using Keytruda.  He was tested positive for K-camlia mutation but negative for EGFR mutation and negative for ALK translocation.  This patient is not a candidate for targeted therapy.   There are clinical trials for K-camila mutation, however patient did prefers not to travel to long distance.      Patient and his wife asked about prognosis and life expectancy and then patient also expressed that he would account for the hospice care.  I think that's a good idea to get hospice on board.  That will be beneficial at least a day can obtain information and be familiar with the hospice care service.  I do not us seeing this patient is dying in the next couple months but should always be prepared.    Patient will have reading  therapy to the left lung mass.  I communicated with radiation oncologist and Dr. Flahertyf will see patient soon.      2.  Pain secondary to cancer involving the right sided posterior chest wall.    Patient reports his pain is much better controlled.  He is self tapering his oxycodone dose, from 5 tablets a day to current 3 tablets a day.  He wants to wean off completely as it causes some nausea for him.  I recommended to use Tylenol or ibuprofen as needed for pain control if he only is experienced very mild pains.       PLAN:   1.  Reevaluation by radiation oncologist Dr. Flaherty for treatment to the left lung mass.    2.  Patient will follow-up with Dr. Flaherty and Dr. Woods for follow-up.    3.  Patient will contact hospice care after he finished radiation therapy.   4.  Patient is concerned about the cost of his health care.  So I would not make him appointment with me at this point.  However, if he prefers I'll be happy to see him again at any time.      ISABEL TAMEZ M.D      5/15/2018.         CC:   Marcos Woods M.D.    Dominga Flaherty M.D.    Alexis Denny M.D.      Dictated using Dragon Dictation.

## 2018-05-21 NOTE — PROGRESS NOTES
DIAGNOSIS and REASON FOR CONSULTATION: Adenocarcinoma of right lung - for advice and recommendations regarding the diagnosis and treatment of a left lung mass    Referring Provider:  No ref. provider found  Patient Care Team:  Alexis Denny MD as PCP - General (Internal Medicine)  Alexis Denny MD as PCP - Family Medicine  Alexis Denny MD as PCP - Claims Attributed  Luis Lowery MD PhD as Consulting Physician (Hematology and Oncology)  Josef Veloz MD as Referring Physician (General Surgery)  Chanelle Flaherty MD as Consulting Physician (Radiation Oncology)    CHIEF COMPLAINT:  For advice and recommendations regarding Adenocarcinoma of right lung  HISTORY OF PRESENT ILLNESS:  The patient is a 82 y.o. 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  He additionally also has a renal abnormality which is within followed by imaging every 6 months. He recently completed palliative radiation treatment directed at the right chest wall, receiving 3000 cGy in 15 treatments from April 23 May 10, 2018.  He presents now to discuss treatment of a left lung mass.    In short, he presented with right-sided rib and chest pain in late 2017 and plain films of the ribs on November 29, 2017 showed no acute fracture but a questionable opacity in the left perihilar region measuring 2.4 x 1.2 cm.  Follow-up CAT scan of the chest on December 8, 2017 showed the right pneumonectomy changes and a nodule in the left lower lobe measuring 2.6 x 2.4 cm which was new.  No additional lymphadenopathy was appreciated.    Follow-up CAT scan on January 17, 2018 again showed this opacity measuring 3.1 cm, increased from December and no other significant abnormality.  He underwent bronchoscopy on  February 6, 2018 and bronchial brushings, washings and lavage were all negative for malignant cells.  Pulmonary function tests completed on February 9, 2018 showed an FVC of 1.82 L, FEV1 of 1.42 L and an FEV1 over FEC of 78%.    He underwent a PET scan on March 12, 2018 which showed moderately intense uptake in the left lower lobe lesion with an SUV of 4 within the 4 cm mass which again has enlarged compared to previous scans.  Additional increased uptake was noted in the posterior right lower chest abutting the chest wall corresponding to a soft tissue density on CT scan between the posterior seventh and eighth ribs with an SUV of 10.3.  No abnormal mediastinal or hilar lymphadenopathy was appreciated.  He then underwent a CT-guided biopsy of the right chest wall lesion on April 5, 2018 which returned positive for a moderately differentiated pulmonary adenocarcinoma.      As the right chest wall mass was significantly symptomatic, we began the palliative course of treatment to that area as mentioned above.  In the interim his tissue was being analyzed and has since returned negative for PDL 1.  Given that he is not a candidate for treatment with keytruda and not a candidate for any targeted therapy, we have recommended treatment of the left-sided lung lesion with radiation therapy.    His pain is significantly better and the right posterior chest wall as he has been able to taper his oxycodone use completely. He is needing less of his constipation regimen as well. He has some mild intermittent nausea which he has meds for and continues on the stool softener and Miralax.  He has stable shortness of breath and no new cough or other respiratory symptoms.  I was asked to see the patient at the request of the referring provider noted above for advice and recommendations regarding this diagnosis.     Past Medical History: he  has a past medical history of Arthritis; Asthma; Back pain; Bacterial infection; Balance  problem; COPD (chronic obstructive pulmonary disease); DDD (degenerative disc disease), cervical; Diabetes mellitus; Elevated prostate specific antigen (PSA); GERD (gastroesophageal reflux disease); Glaucoma; H/O: lung cancer (2006); Hiatal hernia; renal calculi; recurrent pneumonia; Hyperlipemia; Hypertension; Non-small cell lung cancer; Partial small bowel obstruction; Prostate cancer; Sleep apnea; Spinal stenosis; and Visual impairment.    Past Surgical History:  he has a past surgical history that includes Appendectomy; Eye surgery; Hernia repair; Lung surgery (09/25/2008); Other surgical history; Wrist surgery; Colonoscopy (10/25/2011); Esophagoscopy / EGD (11/13/2013); Tonsillectomy; Nasal septum surgery; cystoscopy bladder stone lithotripsy (2007); Thoracotomy (2006); Colectomy (Right, 9/8/2016); Prostate surgery (2004); Vitrectomy (Left, 12/2014); and Trabeculectomy (Left, 12/2014).    Meds:    Current Outpatient Prescriptions:   •  albuterol (PROAIR HFA) 108 (90 BASE) MCG/ACT inhaler, Inhale 2 puffs every 4 (four) hours as needed for wheezing., Disp: , Rfl:   •  ALPRAZolam (XANAX) 0.5 MG tablet, Take 1 tablet by mouth 3 (Three) Times a Day As Needed for Anxiety., Disp: 30 tablet, Rfl: 0  •  aspirin 81 MG tablet, Take 81 mg by mouth daily. PT TO STOP PER MD INSTRUCTION, Disp: , Rfl:   •  atropine 1 % ophthalmic solution, Administer 1 drop into the left eye daily., Disp: , Rfl:   •  cholecalciferol (VITAMIN D3) 1000 UNITS tablet, Take 1,000 Units by mouth daily., Disp: , Rfl:   •  Docusate Calcium (STOOL SOFTENER PO), Take 100 mg by mouth Every Night., Disp: , Rfl:   •  hydrochlorothiazide (HYDRODIURIL) 12.5 MG tablet, Take 1 tablet by mouth Daily., Disp: 90 tablet, Rfl: 1  •  HYDROcodone-acetaminophen (NORCO) 5-325 MG per tablet, Take 1 tablet by mouth Every 4 (Four) Hours As Needed for Moderate Pain  or Severe Pain ., Disp: 180 tablet, Rfl: 0  •  losartan (COZAAR) 100 MG tablet, TAKE 1 TABLET DAILY, Disp:  90 tablet, Rfl: 3  •  ondansetron (ZOFRAN) 8 MG tablet, Take 1 tablet by mouth Every 8 (Eight) Hours As Needed for Nausea or Vomiting., Disp: 20 tablet, Rfl: 2  •  pravastatin (PRAVACHOL) 40 MG tablet, TAKE 1 TABLET DAILY, Disp: 90 tablet, Rfl: 3  •  prednisoLONE acetate (PRED FORTE) 1 % ophthalmic suspension, Administer 1 drop into the left eye daily., Disp: , Rfl:   •  Probiotic Product (PROBIOTIC PO), Take  by mouth 2 (Two) Times a Day., Disp: , Rfl:   •  Psyllium (METAMUCIL PO), Take 4 capsules by mouth daily., Disp: , Rfl:     Allergies:    Allergies   Allergen Reactions   • Brimonidine      EYE INFLAMATION.    • Sulfa Antibiotics Other (See Comments)     Allergic to eye drops   • Tetracycline Other (See Comments)     BURNS ON THE BACK OF BOTH HANDS       Family History:  his family history includes Breast cancer (age of onset: 72) in his mother; Breast cancer (age of onset: 73) in his sister; Cancer in his other; Colon cancer (age of onset: 79) in his sister; Gallbladder disease in his father and mother; Heart attack in his mother; Heart disease in his mother and other; Hypertension in his father and mother; Ovarian cancer (age of onset: 75) in his sister; Stroke in his father.    Social History:  he  reports that he quit smoking about 42 years ago. His smoking use included Cigarettes. He has a 33.00 pack-year smoking history. He has quit using smokeless tobacco. He reports that he drinks about 1.2 oz of alcohol per week . He reports that he does not use drugs.    Pertinent Findings on   Review of Systems   Constitutional: Positive for appetite change and unexpected weight change. Negative for chills, diaphoresis, fatigue and fever.   HENT:   Positive for hearing loss. Negative for lump/mass, mouth sores, nosebleeds, sore throat, tinnitus, trouble swallowing and voice change.    Eyes: Positive for eye problems.   Respiratory: Negative for chest tightness, cough, hemoptysis, shortness of breath and wheezing.     Cardiovascular: Negative for chest pain, leg swelling and palpitations.   Gastrointestinal: Negative for abdominal distention, abdominal pain, blood in stool, constipation, diarrhea, nausea, rectal pain and vomiting.   Endocrine: Negative for hot flashes.   Genitourinary: Negative for bladder incontinence, difficulty urinating, dysuria, frequency, hematuria, nocturia and pelvic pain.    Musculoskeletal: Positive for gait problem. Negative for arthralgias, back pain, flank pain, myalgias, neck pain and neck stiffness.   Skin: Negative for itching and rash.   Neurological: Positive for gait problem. Negative for dizziness, extremity weakness, headaches, light-headedness, numbness, seizures and speech difficulty.   Hematological: Negative for adenopathy. Does not bruise/bleed easily.   Psychiatric/Behavioral: Negative for confusion, decreased concentration, depression, sleep disturbance and suicidal ideas. The patient is not nervous/anxious.    :  There were no vitals filed for this visit.    Performance Status: (2) Ambulatory and capable of self care, unable to carry out work activity, up and about > 50% or waking hours    Pertinent Findings on:  Physical Exam   Constitutional: He is oriented to person, place, and time. He appears well-developed and well-nourished.   HENT:   Head: Normocephalic and atraumatic.   Eyes: EOM are normal.   Neck: Normal range of motion.   Pulmonary/Chest: Effort normal.   Abdominal: Soft.   Musculoskeletal: Normal range of motion.   Pain radiating from mid to low right posterior chest, along rib line anteriorly. Pain restricts raising of right arm.   Neurological: He is alert and oriented to person, place, and time.   Skin: Skin is warm and dry.   Psychiatric: He has a normal mood and affect. His behavior is normal. Judgment and thought content normal.   Vitals reviewed.      Assessment:   1. Adenocarcinoma of right lung - post treatment of the right chest wall and now for treatment of  the left lung lesion    This assessment comes from my review of the imaging, pathology, physician notes and other pertinent information as mentioned.    Plan:   We discussed today the specific treatment options for the left sided disease and as he is not interested in any further surgery nor chemotherapy and is not a candidate for immuno or targeted therapy, I certainly feel if he wishes to pursue treatment, radiation therapy is a good option and given his continued good performance status, I would certainly recommend we treat this last site of current disease.    Therefore, we discussed the possibility of stereotactic or fractionated treatment depending on the size of the lesion and movement on our treatment planning scan. We talked thru course of treatment consisting of approximately 4500 - 5000 cGy, again depending on the size and motion.    We then discussed the acute side effects, specifically mild irritation of the skin in the treatment area and the small likelihood of increased cough, decreased appetite and fatigue.  We discussed the anticipated time frame for the resolution of the above-mentioned symptoms. We then discussed the small but possible long-term possibility of radiation pneumonitis, fibrosis and the possibility of being more short of air at the conclusion of the radiation therapy simply from those benign changes.  We again discussed the importance of our treatment planning process in limiting the volume of lung treated as much as possible and I believe all questions were answered.     We were able to complete our initial treatment planning scan today. I will be fusing the most recent scans on to our treatment planning scan to insure adequate coverage of the involved area and I am anticipating getting the treatments underway later this week if possible.     I spent greater than 45 minutes in face-to-face time with the patient and 30 minutes of that time was spent in counseling and coordination of  care, including review of imaging and pathology; prognosis and differential diagnosis; indications, goals, logistics, benefits and risks of treatment as well as alternatives and surveillance options.

## 2018-05-22 PROBLEM — C34.32 MALIGNANT NEOPLASM OF LOWER LOBE OF LEFT LUNG (HCC): Status: ACTIVE | Noted: 2018-01-01

## 2018-06-05 NOTE — PROGRESS NOTES
"  Physician Weekly Management Note    Diagnosis:     1. Malignant neoplasm of lower lobe of left lung      Reason for Visit:   Tx: 4/15    Concurrent Chemo:   No    Notes on Treatment course, Films, Medical progress and Plan:  Going well. Pain on right improved but plateaued and he is frustrated by that. Requiring no pain meds and more active. No problems with left. Fractionation discussed. Cont on.    ROS - Other than as listed above, as follows:  Constitutional - Normal - no complaints of fatigue, denies lack of appetite, fever, night sweats or change in weight.  Neck - Normal - denies neck masses, muscle weakness, neck pain, decreased range of motion or swelling.  Cardiovascular - Normal - denies arrhythmias, chest pain, dyspnea, edema, orthopnea or palpitations.  Respiratory - Normal - denies cough, dyspnea, hemoptysis, hiccoughs, pleuritic chest pain or wheezing.  Gastrointestinal - Normal - no complaints of constipation, abdominal pain, diarrhea, heartburn/dyspepsia, hematemesis, hemorrhoids, melena or GI bleeding, nausea, pain or cramping or vomiting.    PHYSICAL EXAM - Other than as listed above, as follows:  Vitals:    06/05/18 1408   BP: 115/68   Pulse: 98   Resp: 16   Temp: 98.6 °F (37 °C)   TempSrc: Tympanic   SpO2: 98%   Weight: 83 kg (183 lb)   Height: 174 cm (68.5\")   PainSc: 0-No pain       Constitutional - Normal - no evidence of impaired alertness, inadequate appearance, premature or advanced chronologic age, uncooperativeness, altered mood, affect or disorientation.  Neck - Normal - no evidence of tender or enlarged lymph nodes, neck abnormalities, restricted range of motion or enlarged thyroid.  Chest - Normal - no evidence of chest abnormalities, tender or enlarged lymph nodes.  Respiratory - Normal - no evidence of abnormal breat sounds, chest abnormalities on palpation and chest abnormalities on percussion and normal breath sounds.  Hematologic/Lymphatic - Normal - no evidence of tender or " "enlarged axillary lymph nodes nor tender or enlarged neck nodes.    Performance Status:  (2) Ambulatory and capable of self care, unable to carry out work activity, up and about > 50% or waking hours    Problem added:  No problems updated.  Medications added: No orders of the defined types were placed in this encounter.    Ancillary referrals made: No orders of the defined types were placed in this encounter.      Technical aspects reviewed:  Weekly OBI approved if applicable? Yes  Weekly port films approved?   Yes  Change requests noted if applicable?  No  Patient setup and plan reviewed?  Yes    Chart Reviewed:  Continue current treatment plan?   Yes  Treatment plan change requested?  No    I have reviewed and marked as \"reviewed\" the current medications, allergies and problem list in the patients EMR.  I have reviewed the patient's medical, surgical  history in detail, reviewed any pertinent lab work and updated the computerized patient record if needed.    Patient's Care Team:  Patient Care Team:  Alexis Denny MD as PCP - General (Internal Medicine)  Alexis Denny MD as PCP - Family Medicine  Alexis Denny MD as PCP - [facility]  Luis Lowery MD PhD as Consulting Physician (Hematology and Oncology)  Josef Veloz MD as Referring Physician (General Surgery)  Chanelle Flaherty MD as Consulting Physician (Radiation Oncology)    Seen and approved by:  Chanelle Flaherty MD, 04/26/2018    "

## 2018-06-13 NOTE — PROGRESS NOTES
"  Physician Weekly Management Note    Diagnosis:     1. Malignant neoplasm of lower lobe of left lung      Reason for Visit:   Tx: 10/15    Concurrent Chemo:   No    Notes on Treatment course, Films, Medical progress and Plan:  Going well. Pain on right improved, multiple questions about that again. Still improving just slowly. Requiring no pain meds and more active. No problems with left.. Cont on.    ROS - Other than as listed above, as follows:  Constitutional - Normal - no complaints of fatigue, denies lack of appetite, fever, night sweats or change in weight.  Neck - Normal - denies neck masses, muscle weakness, neck pain, decreased range of motion or swelling.  Cardiovascular - Normal - denies arrhythmias, chest pain, dyspnea, edema, orthopnea or palpitations.  Respiratory - Normal - denies cough, dyspnea, hemoptysis, hiccoughs, pleuritic chest pain or wheezing.  Gastrointestinal - Normal - no complaints of constipation, abdominal pain, diarrhea, heartburn/dyspepsia, hematemesis, hemorrhoids, melena or GI bleeding, nausea, pain or cramping or vomiting.    PHYSICAL EXAM - Other than as listed above, as follows:  Vitals:    06/13/18 1406   BP: 129/78   Pulse: 89   Resp: 16   Temp: 96.8 °F (36 °C)   TempSrc: Oral   SpO2: 93%   Weight: 83 kg (183 lb)   Height: 174 cm (68.5\")   PainSc:   2   PainLoc: Rib Cage       Constitutional - Normal - no evidence of impaired alertness, inadequate appearance, premature or advanced chronologic age, uncooperativeness, altered mood, affect or disorientation.  Neck - Normal - no evidence of tender or enlarged lymph nodes, neck abnormalities, restricted range of motion or enlarged thyroid.  Chest - Normal - no evidence of chest abnormalities, tender or enlarged lymph nodes.  Respiratory - Normal - no evidence of abnormal breat sounds, chest abnormalities on palpation and chest abnormalities on percussion and normal breath sounds.  Hematologic/Lymphatic - Normal - no evidence of " "tender or enlarged axillary lymph nodes nor tender or enlarged neck nodes.    Performance Status:  (2) Ambulatory and capable of self care, unable to carry out work activity, up and about > 50% or waking hours    Problem added:  No problems updated.  Medications added: No orders of the defined types were placed in this encounter.    Ancillary referrals made: No orders of the defined types were placed in this encounter.      Technical aspects reviewed:  Weekly OBI approved if applicable? Yes  Weekly port films approved?   Yes  Change requests noted if applicable?  No  Patient setup and plan reviewed?  Yes    Chart Reviewed:  Continue current treatment plan?   Yes  Treatment plan change requested?  No    I have reviewed and marked as \"reviewed\" the current medications, allergies and problem list in the patients EMR.  I have reviewed the patient's medical, surgical  history in detail, reviewed any pertinent lab work and updated the computerized patient record if needed.    Patient's Care Team:  Patient Care Team:  Alexis Denny MD as PCP - General (Internal Medicine)  Alexis Denny MD as PCP - Family Medicine  Alexis Denny MD as PCP - [facility]  Luis Lowery MD PhD as Consulting Physician (Hematology and Oncology)  Josef Veloz MD as Referring Physician (General Surgery)  Chanelle Flaherty MD as Consulting Physician (Radiation Oncology)    Seen and approved by:  Chanelle Flaherty MD, 04/26/2018    "

## 2018-06-18 NOTE — TELEPHONE ENCOUNTER
PLEASE CALL PT AND ADVISE PT HE IS DUE FOR FASTING LABS AND F/U WITH DR. DE LA TORRE. SCHEDULE APPT

## 2018-06-19 NOTE — PROGRESS NOTES
Physician Weekly Management Note    Diagnosis:     1. Malignant neoplasm of lower lobe of left lung      Reason for Visit:   Tx: 14/15    Concurrent Chemo:   No    Notes on Treatment course, Films, Medical progress and Plan:  Going well. Pain on right persists but more active .Taking only aleve now. No problems with left.. Follow up questions. CBC dismissed him as he was interested in supportive care only. He is to follow up with Dr. Woods as well but not sure when. Will see what he wishes and order/plan from there. Cont on.    ROS - Other than as listed above, as follows:  Constitutional - Normal - no complaints of fatigue, denies lack of appetite, fever, night sweats or change in weight.  Neck - Normal - denies neck masses, muscle weakness, neck pain, decreased range of motion or swelling.  Cardiovascular - Normal - denies arrhythmias, chest pain, dyspnea, edema, orthopnea or palpitations.  Respiratory - Normal - denies cough, dyspnea, hemoptysis, hiccoughs, pleuritic chest pain or wheezing.  Gastrointestinal - Normal - no complaints of constipation, abdominal pain, diarrhea, heartburn/dyspepsia, hematemesis, hemorrhoids, melena or GI bleeding, nausea, pain or cramping or vomiting.    PHYSICAL EXAM - Other than as listed above, as follows:  Vitals:    06/19/18 1424   BP: 119/72   Pulse: 86   Resp: 16   SpO2: 94%   PainSc: 0-No pain       Constitutional - Normal - no evidence of impaired alertness, inadequate appearance, premature or advanced chronologic age, uncooperativeness, altered mood, affect or disorientation.  Neck - Normal - no evidence of tender or enlarged lymph nodes, neck abnormalities, restricted range of motion or enlarged thyroid.  Chest - Normal - no evidence of chest abnormalities, tender or enlarged lymph nodes.  Respiratory - Normal - no evidence of abnormal breat sounds, chest abnormalities on palpation and chest abnormalities on percussion and normal breath sounds.  Hematologic/Lymphatic -  "Normal - no evidence of tender or enlarged axillary lymph nodes nor tender or enlarged neck nodes.    Performance Status:  (2) Ambulatory and capable of self care, unable to carry out work activity, up and about > 50% or waking hours    Problem added:  No problems updated.  Medications added: No orders of the defined types were placed in this encounter.    Ancillary referrals made: No orders of the defined types were placed in this encounter.      Technical aspects reviewed:  Weekly OBI approved if applicable? Yes  Weekly port films approved?   Yes  Change requests noted if applicable?  No  Patient setup and plan reviewed?  Yes    Chart Reviewed:  Continue current treatment plan?   Yes  Treatment plan change requested?  No    I have reviewed and marked as \"reviewed\" the current medications, allergies and problem list in the patients EMR.  I have reviewed the patient's medical, surgical  history in detail, reviewed any pertinent lab work and updated the computerized patient record if needed.    Patient's Care Team:  Patient Care Team:  Alexis Denny MD as PCP - General (Internal Medicine)  Alexis Denny MD as PCP - Family Medicine  Alexis Denny MD as PCP - [facility]  Luis Lowery MD PhD as Consulting Physician (Hematology and Oncology)  Josef Veloz MD as Referring Physician (General Surgery)  Chanelle Flaherty MD as Consulting Physician (Radiation Oncology)    Seen and approved by:  Chanelle Flaherty MD, 04/26/2018    "

## 2018-06-22 NOTE — PROGRESS NOTES
RADIATION THERAPY TREATMENT SUMMARY    Diagnosis: Malignant neoplasm of left lung     Patient Care Team:  Alexis Denny MD as PCP - General (Internal Medicine)  Alexis Denny MD as PCP - Family Medicine  Alexis Denny MD as PCP - [facility]  Luis Lowery MD PhD as Consulting Physician (Hematology and Oncology)  Josef Veloz MD as Referring Physician (General Surgery)  Chanelle Flaherty MD as Consulting Physician (Radiation Oncology)    Carlito Hicks has recently completed the course of radiation therapy prescribed for the above-mentioned diagnosis.  Below, please find the specifics of the course of treatment delivered:    Radiation Details:    Dates of treatment: 5/31/2018 - 6/20/2018.  Treatment Site - Left Lung  Treatment Intent - Palliative  Total Dose in cGy - 4500  Number of Treatments - 15  Dose per fraction - 300 cGy per fraction  Fractions per day - 1 fx/day  Fractions per week - 5 fx/week  Treatment Type - Rapid Arc  Energy - 6 MVP  Normalization - Volumetric Normalization-- see below  Imaging/Field Verification - Simulation before first treatment to verify field, blocking, placement and positioning, Simulation for all ports of change, IGRT using CBCT daily    Tolerance and Toxicities: He tolerated the treatments well, with no significant side effects. His performance status improved during treatment and he required no treatment breaks. He completed the treatments in 20 elapsed days.    Follow-up Plans:  He does wish to continue follow up with Dr. Woods and they were to contact him to ask about scans/follow up. I will follow up with the patient as well and order scans/appointments as necessary depending on the above. They are aware I would recommend no imaging for 4-6 weeks out from treatment.

## 2018-07-12 NOTE — PROGRESS NOTES
Subjective   Carlito Hicks is a 82 y.o. male. 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   • Balance problem     DUE TO VISION TROUBLE; USES A CANE   • COPD (chronic obstructive pulmonary disease) (CMS/HCC)    • DDD (degenerative disc disease), cervical     SLIGHT NEUROPATHY RIGHT HAND   • Diabetes mellitus (CMS/HCC)     DIET CONTROLLED   • Elevated prostate specific antigen (PSA)    • GERD (gastroesophageal reflux disease)    • Glaucoma    • H/O: lung cancer 2006   • Hiatal hernia    • Hx of renal calculi    • Hx: recurrent pneumonia    • Hyperlipemia    • Hypertension    • Non-small cell lung cancer (CMS/HCC)     Stage IB   • Partial small bowel obstruction    • Prostate cancer (CMS/HCC)    • Sleep apnea     USES CPAP   • Spinal stenosis    • Visual impairment     BLIND LEFT EYE; 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    • Sulfa Antibiotics Other (See Comments)     Allergic to eye drops   • Tetracycline Other (See Comments)     BURNS ON THE BACK OF BOTH HANDS      Social History     Social History   • Marital status:      Spouse name: Lianna   • Number of children: 5   • Years of education: Post Grad     Occupational History   • [employer]  Retired     Social History Main Topics   • Smoking status: Former Smoker     Packs/day: 1.50     Years: 22.00     Types: Cigarettes     Quit date: 9/2/1975   • Smokeless tobacco: Former User   • Alcohol use 1.2 oz/week     2 Shots of liquor per week   • Drug use: No   • Sexual activity: Defer      Other Topics Concern   • Not on file     Social History Narrative   • No narrative on file        Current Outpatient Prescriptions:   •  albuterol (PROAIR HFA) 108 (90 BASE) MCG/ACT inhaler, Inhale 2 puffs every 4 (four) hours as needed for wheezing., Disp: , Rfl:   •  ALPRAZolam (XANAX) 0.5 MG tablet, Take 1 tablet by mouth 3 (Three) Times a Day As Needed for Anxiety., Disp: 30 tablet, Rfl: 0  •  aspirin 81 MG tablet, Take 81 mg by mouth daily. PT TO STOP PER MD INSTRUCTION, Disp: , Rfl:   •  atropine 1 % ophthalmic solution, Administer 1 drop into the left eye daily., Disp: , Rfl:   •  cholecalciferol (VITAMIN D3) 1000 UNITS tablet, Take 1,000 Units by mouth daily., Disp: , Rfl:   •  Docusate Calcium (STOOL SOFTENER PO), Take 100 mg by mouth Every Night., Disp: , Rfl:   •  hydrochlorothiazide (HYDRODIURIL) 12.5 MG tablet, TAKE ONE TABLET BY MOUTH DAILY, Disp: 30 tablet, Rfl: 0  •  HYDROcodone-acetaminophen (NORCO) 5-325 MG per tablet, Take 1 tablet by mouth Every 4 (Four) Hours As Needed for Moderate Pain  or Severe Pain ., Disp: 180 tablet, Rfl: 0  •  losartan (COZAAR) 100 MG tablet, TAKE 1 TABLET DAILY, Disp: 90 tablet, Rfl: 3  •  pravastatin (PRAVACHOL) 40 MG tablet, TAKE 1 TABLET DAILY, Disp: 90 tablet, Rfl: 3  •  prednisoLONE acetate (PRED FORTE) 1 % ophthalmic suspension, Administer 1 drop into the left eye daily., Disp: , Rfl:   •  Probiotic Product (PROBIOTIC PO), Take  by mouth 2 (Two) Times a Day., Disp: , Rfl:   •  Psyllium (METAMUCIL PO), Take 4 capsules by mouth daily., Disp: , Rfl:   •  ondansetron (ZOFRAN) 8 MG tablet, Take 1 tablet by mouth Every 8 (Eight) Hours As Needed for Nausea or Vomiting., Disp: 20 tablet, Rfl: 2     Objective     He is here to follow-up on hypercholesterolemia.  He had labs done last week.    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  He has pain in his right chest secondary to his lung cancer.  He feels that his pain is relatively well-controlled when he takes  his narcotic analgesic although it does make him a bit confused and fatigued and occasionally causes constipation.  Consequently, he uses his narcotic analgesics sparingly.      Back Pain          Review of Systems   Constitutional: Negative.    HENT: Negative.    Respiratory: Negative.    Cardiovascular: Negative.    Musculoskeletal: Positive for back pain.        He complains of right-sided chest pain.   Psychiatric/Behavioral:        Dose that he may be depressed.       Physical Exam   Constitutional: He is oriented to person, place, and time. He appears well-developed and well-nourished.   HENT:   Head: Normocephalic and atraumatic.   Cardiovascular: Normal rate and regular rhythm.    Pulmonary/Chest: Effort normal.   Neurological: He is alert and oriented to person, place, and time.   Psychiatric: He has a normal mood and affect. His behavior is normal.   Nursing note and vitals reviewed.        Problem List Items Addressed This Visit        Cardiovascular and Mediastinum    Essential hypertension    Hypercholesterolemia - Primary       Other    Adenocarcinoma of right lung (CMS/HCC)            PLAN  His hypertension is well-controlled.    His hypercholesterolemia is well-controlled.    He has adenocarcinoma of the right lung.  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  This causes pain.  I refilled his narcotic analgesic.    I asked him to follow-up with me in about 3 months.  No Follow-up on file.

## 2018-08-15 NOTE — TELEPHONE ENCOUNTER
CALLED PT AND PT'S WIFE IS LETTING PT KNOW HIS PRESCRIPTION IS READY FOR .   ----- Message from Zan Villalba MA sent at 8/14/2018 11:48 AM EDT -----  Contact: PT   PT CALLED IN TODAY AND STATED HE NEEDED A REFILL ON HYDROcodone-acetaminophen (NORCO) 5-325 MG. PLEASE CALL PT BACK @ [phone] WHEN READY FOR .

## 2018-10-04 ENCOUNTER — TELEPHONE (OUTPATIENT)
Dept: FAMILY MEDICINE CLINIC | Facility: CLINIC | Age: 83
End: 2018-10-04

## 2018-10-04 NOTE — TELEPHONE ENCOUNTER
DR. DE LA TORRE KNEW.   ----- Message from Antonina Spear sent at 10/4/2018 12:07 PM EDT -----  THELMA SANCHEZ CALLING   PT PASSED AWAY 10/3/18  PM     LUNG CANCER